# Patient Record
Sex: FEMALE | Race: BLACK OR AFRICAN AMERICAN | Employment: FULL TIME | URBAN - METROPOLITAN AREA
[De-identification: names, ages, dates, MRNs, and addresses within clinical notes are randomized per-mention and may not be internally consistent; named-entity substitution may affect disease eponyms.]

---

## 2018-07-17 ENCOUNTER — TELEPHONE (OUTPATIENT)
Dept: FAMILY MEDICINE CLINIC | Age: 48
End: 2018-07-17

## 2018-07-17 DIAGNOSIS — Z01.419 WELL WOMAN EXAM: Primary | ICD-10-CM

## 2018-07-17 NOTE — TELEPHONE ENCOUNTER
Patient asking that fasting lab order be placed for pending physical with you when schedule opens. Patient placed on call list as she states she's been trying to get appt now for months.       Patient have lab appt for 8/3/18     Thanks

## 2018-07-18 NOTE — TELEPHONE ENCOUNTER
Attempted to call patient to notify labs have been placed. Left voicemail for patient to return call.

## 2018-07-19 NOTE — TELEPHONE ENCOUNTER
Notified patient of labs have been ordered. Appointment scheduled for 8/8/18 with Dr. Chasity Garcia. Patient verbalized understanding.

## 2018-08-03 ENCOUNTER — LAB ONLY (OUTPATIENT)
Dept: FAMILY MEDICINE CLINIC | Age: 48
End: 2018-08-03

## 2018-08-03 DIAGNOSIS — Z01.419 WELL WOMAN EXAM: ICD-10-CM

## 2018-08-03 NOTE — MR AVS SNAPSHOT
[address] 
[phone] Patient: Hood Jones MRN: [id number] NQU:[id number] Visit Information Date & Time Provider Department Dept. Phone Encounter #  
 8/3/2018  9:00 AM LAB [facility] [address] [place] [address] [phone] [id number] Your Appointments 8/8/2018  3:00 PM  
Complete Physical with Linda Juarez MD  
[address] [place] [address]) Appt Note: Complete physical per Dr. Matilde Lombardi [address] [facility]  
[phone]  
  
   
 [address] [phone] Upcoming Health Maintenance Date Due  
 PAP AKA CERVICAL CYTOLOGY 12/21/1992 Influenza Age 5 to Adult 8/1/2018 DTaP/Tdap/Td series (2 - Td) 8/14/2024 Allergies as of 8/3/2018  Review Complete On: 10/26/2015 By: Linda Juarez MD  
  
 Severity Noted Reaction Type Reactions Amoxicillin  11/01/2013    Itching Current Immunizations  Never Reviewed Name Date Influenza Vaccine 10/12/2015 Tdap 8/14/2014 Not reviewed this visit You Were Diagnosed With   
  
 Codes Comments Well woman exam     ICD-10-CM: M85.529 ICD-9-CM: V72.31 Vitals OB Status Smoking Status Hysterectomy Never Smoker Preferred Pharmacy Pharmacy Name Phone [address] [place] [id number] [facility] [phone] Your Updated Medication List  
  
   
This list is accurate as of 8/3/18 11:42 AM.  Always use your most recent med list.  
  
  
  
  
 FEOSOL 45 mg Tab Generic drug:  iron, carbonyl Take 1 Tab by mouth daily. M-VIT PO Take  by mouth.  
  
 multivitamin tablet Commonly known as:  ONE A DAY Take 1 Tab by mouth daily. ORTHO-NOVUM 7/7/7 (28) 0.5/0.75/1 mg- 35 mcg Tab Generic drug:  norethindrone-ethinyl estradiol Take 1 Tab by mouth daily. VITAMIN D3 2,000 unit Cap capsule Generic drug:  Cholecalciferol (Vitamin D3) Take  by mouth two (2) times a day. We Performed the Following CBC W/O DIFF [24402 CPT(R)] HEMOGLOBIN A1C WITH EAG [87786 CPT(R)] LIPID PANEL [96055 CPT(R)] METABOLIC PANEL, COMPREHENSIVE [37427 CPT(R)] TSH 3RD GENERATION [50325 CPT(R)] Introducing [facility] & HEALTH SERVICES! Romayne Duster introduces 19pay patient portal. Now you can access parts of your medical record, email your doctor's office, and request medication refills online. 1. In your internet browser, go to [email or web address] 2. Click on the First Time User? Click Here link in the Sign In box. You will see the New Member Sign Up page. 3. Enter your 19pay Access Code exactly as it appears below. You will not need to use this code after youve completed the sign-up process. If you do not sign up before the expiration date, you must request a new code. · 19pay Access Code: [email or web address] Expires: 11/1/2018 11:42 AM 
 
4. Enter the last four digits of your Social Security Number (xxxx) and Date of Birth (mm/dd/yyyy) as indicated and click Submit. You will be taken to the next sign-up page. 5. Create a 19pay ID. This will be your 19pay login ID and cannot be changed, so think of one that is secure and easy to remember. 6. Create a 19pay password. You can change your password at any time. 7. Enter your Password Reset Question and Answer. This can be used at a later time if you forget your password. 8. Enter your e-mail address. You will receive e-mail notification when new information is available in [address] 9. Click Sign Up. You can now view and download portions of your medical record. 10. Click the Download Summary menu link to download a portable copy of your medical information.  
 
If you have questions, please visit the Frequently Asked Questions section of the [facility]. Remember, Docea Powerhart is NOT to be used for urgent needs. For medical emergencies, dial 911. Now available from your iPhone and Android! Please provide this summary of care documentation to your next provider. Your primary care clinician is listed as Jeremías Waters. If you have any questions after today's visit, please call [phone].

## 2018-08-04 LAB
ALBUMIN SERPL-MCNC: 4.2 G/DL (ref 3.5–5.5)
ALBUMIN/GLOB SERPL: 1.4 {RATIO} (ref 1.2–2.2)
ALP SERPL-CCNC: 77 IU/L (ref 39–117)
ALT SERPL-CCNC: 17 IU/L (ref 0–32)
AST SERPL-CCNC: 19 IU/L (ref 0–40)
BILIRUB SERPL-MCNC: 0.3 MG/DL (ref 0–1.2)
BUN SERPL-MCNC: 16 MG/DL (ref 6–24)
BUN/CREAT SERPL: 21 (ref 9–23)
CALCIUM SERPL-MCNC: 9.3 MG/DL (ref 8.7–10.2)
CHLORIDE SERPL-SCNC: 106 MMOL/L (ref 96–106)
CHOLEST SERPL-MCNC: 143 MG/DL (ref 100–199)
CO2 SERPL-SCNC: 20 MMOL/L (ref 20–29)
CREAT SERPL-MCNC: 0.77 MG/DL (ref 0.57–1)
ERYTHROCYTE [DISTWIDTH] IN BLOOD BY AUTOMATED COUNT: 13.9 % (ref 12.3–15.4)
EST. AVERAGE GLUCOSE BLD GHB EST-MCNC: 105 MG/DL
GLOBULIN SER CALC-MCNC: 2.9 G/DL (ref 1.5–4.5)
GLUCOSE SERPL-MCNC: 94 MG/DL (ref 65–99)
HBA1C MFR BLD: 5.3 % (ref 4.8–5.6)
HCT VFR BLD AUTO: 35 % (ref 34–46.6)
HDLC SERPL-MCNC: 53 MG/DL
HGB BLD-MCNC: 10.9 G/DL (ref 11.1–15.9)
INTERPRETATION, 910389: NORMAL
LDLC SERPL CALC-MCNC: 80 MG/DL (ref 0–99)
MCH RBC QN AUTO: 25.1 PG (ref 26.6–33)
MCHC RBC AUTO-ENTMCNC: 31.1 G/DL (ref 31.5–35.7)
MCV RBC AUTO: 81 FL (ref 79–97)
PLATELET # BLD AUTO: 310 X10E3/UL (ref 150–379)
POTASSIUM SERPL-SCNC: 4.5 MMOL/L (ref 3.5–5.2)
PROT SERPL-MCNC: 7.1 G/DL (ref 6–8.5)
RBC # BLD AUTO: 4.35 X10E6/UL (ref 3.77–5.28)
SODIUM SERPL-SCNC: 146 MMOL/L (ref 134–144)
TRIGL SERPL-MCNC: 52 MG/DL (ref 0–149)
TSH SERPL DL<=0.005 MIU/L-ACNC: 1.79 UIU/ML (ref 0.45–4.5)
VLDLC SERPL CALC-MCNC: 10 MG/DL (ref 5–40)
WBC # BLD AUTO: 5.8 X10E3/UL (ref 3.4–10.8)

## 2018-08-08 ENCOUNTER — OFFICE VISIT (OUTPATIENT)
Dept: FAMILY MEDICINE CLINIC | Age: 48
End: 2018-08-08

## 2018-08-08 VITALS
BODY MASS INDEX: 36.88 KG/M2 | RESPIRATION RATE: 17 BRPM | HEIGHT: 62 IN | WEIGHT: 200.4 LBS | TEMPERATURE: 98.3 F | HEART RATE: 85 BPM | SYSTOLIC BLOOD PRESSURE: 136 MMHG | DIASTOLIC BLOOD PRESSURE: 78 MMHG | OXYGEN SATURATION: 99 %

## 2018-08-08 DIAGNOSIS — Z00.00 WELL WOMAN EXAM WITHOUT GYNECOLOGICAL EXAM: Primary | ICD-10-CM

## 2018-08-08 DIAGNOSIS — D64.9 ANEMIA, UNSPECIFIED TYPE: ICD-10-CM

## 2018-08-08 NOTE — PROGRESS NOTES
Chief Complaint   Patient presents with    Complete Physical     1. Have you been to the ER, urgent care clinic since your last visit? Hospitalized since your last visit? No    2. Have you seen or consulted any other health care providers outside of the [address] [place] [address] since your last visit? Include any pap smears or colon screening.  No

## 2018-08-08 NOTE — MR AVS SNAPSHOT
[address] 
[phone] Patient: Marry Chapa MRN: [id number] Visit Information Date & Time Provider Department Dept. Phone Encounter #  
 8/8/2018  3:00 PM Sundeep Diaz, [address] [facility] [phone] [id number] Follow-up Instructions Return if symptoms worsen or fail to improve. Upcoming Health Maintenance Date Due  
 PAP AKA CERVICAL CYTOLOGY 12/21/1992 Influenza Age 5 to Adult 8/1/2018 DTaP/Tdap/Td series (2 - Td) 8/14/2024 Allergies as of 8/8/2018  Review Complete On: 8/8/2018 By: Sundeep Diaz MD  
  
 Severity Noted Reaction Type Reactions Amoxicillin  11/01/2013    Itching Current Immunizations  Never Reviewed Name Date Influenza Vaccine 10/12/2015 Tdap 8/14/2014 Not reviewed this visit You Were Diagnosed With   
  
 Codes Comments Well woman exam without gynecological exam    -  Primary ICD-10-CM: Z00.00 ICD-9-CM: V70.0 Anemia, unspecified type     ICD-10-CM: D64.9 ICD-9-CM: 335. 9 Vitals BP Pulse Temp Resp Height(growth percentile) Weight(growth percentile) 140/79 85 98.3 °F (36.8 °C) (Oral) 17 5' 2\" (1.575 m) 200 lb 6.4 oz (90.9 kg) SpO2 BMI OB Status Smoking Status 99% 36.65 kg/m2 Hysterectomy Never Smoker Vitals History BMI and BSA Data Body Mass Index Body Surface Area  
 36.65 kg/m 2 1.99 m 2 Preferred Pharmacy Pharmacy Name Phone [facility] [address] [facility] - [address], [place] - [phone] [facility] [phone] Your Updated Medication List  
  
   
This list is accurate as of 8/8/18  3:52 PM.  Always use your most recent med list.  
  
  
  
  
 FEOSOL 45 mg Tab Generic drug:  iron, carbonyl Take 1 Tab by mouth daily. M-VIT PO Take  by mouth.  
  
 multivitamin tablet Commonly known as:  ONE A DAY Take 1 Tab by mouth daily. VITAMIN D3 2,000 unit Cap capsule Generic drug:  Cholecalciferol (Vitamin D3) Take  by mouth two (2) times a day. We Performed the Following CBC WITH AUTOMATED DIFF [06031 CPT(R)] HEMOGLOBIN FRACTIONATION [XGF18960 Custom] IRON PROFILE K5802125 CPT(R)] RETICULOCYTE COUNT I482811 CPT(R)] VITAMIN B12 & FOLATE [76262 CPT(R)] Follow-up Instructions Return if symptoms worsen or fail to improve. Patient Instructions Anemia: Care Instructions Your Care Instructions Anemia is a low level of red blood cells, which carry oxygen throughout your body. Many things can cause anemia. Lack of iron is one of the most common causes. Your body needs iron to make hemoglobin, a substance in red blood cells that carries oxygen from the lungs to your body's cells. Without enough iron, the body produces fewer and smaller red blood cells. As a result, your body's cells do not get enough oxygen, and you feel tired and weak. And you may have trouble concentrating. Bleeding is the most common cause of a lack of iron. You may have heavy menstrual bleeding or bleeding caused by conditions such as ulcers, hemorrhoids, or cancer. Regular use of aspirin or other anti-inflammatory medicines (such as ibuprofen) also can cause bleeding in some people. A lack of iron in your diet also can cause anemia, especially at times when the body needs more iron, such as during pregnancy, infancy, and the teen years. Your doctor may have prescribed iron pills. It may take several months of treatment for your iron levels to return to normal. Your doctor also may suggest that you eat foods that are rich in iron, such as meat and beans. There are many other causes of anemia. It is not always due to a lack of iron. Finding the specific cause of your anemia will help your doctor find the right treatment for you. Follow-up care is a key part of your treatment and safety.  Be sure to make and go to all appointments, and call your doctor if you are having problems. It's also a good idea to know your test results and keep a list of the medicines you take. How can you care for yourself at home? · Take your medicines exactly as prescribed. Call your doctor if you think you are having a problem with your medicine. · If your doctor recommends iron pills, take them as directed: ¨ Try to take the pills on an empty stomach about 1 hour before or 2 hours after meals. But you may need to take iron with food to avoid an upset stomach. ¨ Do not take antacids or drink milk or caffeine drinks (such as coffee, tea, or cola) at the same time or within 2 hours of the time that you take your iron. They can make it hard for your body to absorb the iron. ¨ Vitamin C (from food or supplements) helps your body absorb iron. Try taking iron pills with a glass of orange juice or some other food that is high in vitamin C, such as citrus fruits. ¨ Iron pills may cause stomach problems, such as heartburn, nausea, diarrhea, constipation, and cramps. Be sure to drink plenty of fluids, and include fruits, vegetables, and fiber in your diet each day. Iron pills often make your bowel movements dark or green. ¨ If you forget to take an iron pill, do not take a double dose of iron the next time you take a pill. ¨ Keep iron pills out of the reach of small children. An overdose of iron can be very dangerous. · Follow your doctor's advice about eating iron-rich foods. These include red meat, shellfish, poultry, eggs, beans, raisins, whole-grain bread, and leafy green vegetables. · Steam vegetables to help them keep their iron content. When should you call for help? Call 911 anytime you think you may need emergency care. For example, call if: 
  · You have symptoms of a heart attack. These may include: ¨ Chest pain or pressure, or a strange feeling in the chest. 
¨ Sweating. ¨ Shortness of breath. ¨ Nausea or vomiting. ¨ Pain, pressure, or a strange feeling in the back, neck, jaw, or upper belly or in one or both shoulders or arms. ¨ Lightheadedness or sudden weakness. ¨ A fast or irregular heartbeat. After you call 911, the  may tell you to chew 1 adult-strength or 2 to 4 low-dose aspirin. Wait for an ambulance. Do not try to drive yourself.  
  · You passed out (lost consciousness).  
 Call your doctor now or seek immediate medical care if: 
  · You have new or increased shortness of breath.  
  · You are dizzy or lightheaded, or you feel like you may faint.  
  · Your fatigue and weakness continue or get worse.  
  · You have any abnormal bleeding, such as: 
¨ Nosebleeds. ¨ Vaginal bleeding that is different (heavier, more frequent, at a different time of the month) than what you are used to. ¨ Bloody or black stools, or rectal bleeding. ¨ Bloody or pink urine.  
 Watch closely for changes in your health, and be sure to contact your doctor if: 
  · You do not get better as expected. Where can you learn more? Go to http://neetuWorldWingeraries.info/. Enter R301 in the search box to learn more about \"Anemia: Care Instructions. \" Current as of: October 9, 2017 Content Version: 11.7 © 7930-4346 [employer]. Care instructions adapted under license by [employer] (which disclaims liability or warranty for this information). If you have questions about a medical condition or this instruction, always ask your healthcare professional. [place] [address] any warranty or liability for your use of this information. Well Visit, Ages 25 to 48: Care Instructions Your Care Instructions Physical exams can help you stay healthy. Your doctor has checked your overall health and may have suggested ways to take good care of yourself. He or she also may have recommended tests. At home, you can help prevent illness with healthy eating, regular exercise, and other steps. Follow-up care is a key part of your treatment and safety. Be sure to make and go to all appointments, and call your doctor if you are having problems. It's also a good idea to know your test results and keep a list of the medicines you take. How can you care for yourself at home? · Reach and stay at a healthy weight. This will lower your risk for many problems, such as obesity, diabetes, heart disease, and high blood pressure. · Get at least 30 minutes of physical activity on most days of the week. Walking is a good choice. You also may want to do other activities, such as running, swimming, cycling, or playing tennis or team sports. Discuss any changes in your exercise program with your doctor. · Do not smoke or allow others to smoke around you. If you need help quitting, talk to your doctor about stop-smoking programs and medicines. These can increase your chances of quitting for good. · Talk to your doctor about whether you have any risk factors for sexually transmitted infections (STIs). Having one sex partner (who does not have STIs and does not have sex with anyone else) is a good way to avoid these infections. · Use birth control if you do not want to have children at this time. Talk with your doctor about the choices available and what might be best for you. · Protect your skin from too much sun. When you're outdoors from 10 a.m. to 4 p.m., stay in the shade or cover up with clothing and a hat with a wide brim. Wear sunglasses that block UV rays. Even when it's cloudy, put broad-spectrum sunscreen (SPF 30 or higher) on any exposed skin. · See a dentist one or two times a year for checkups and to have your teeth cleaned. · Wear a seat belt in the car. · Drink alcohol in moderation, if at all. That means no more than 2 drinks a day for men and 1 drink a day for women. Follow your doctor's advice about when to have certain tests. These tests can spot problems early. For everyone · Cholesterol. Have the fat (cholesterol) in your blood tested after age 21. Your doctor will tell you how often to have this done based on your age, family history, or other things that can increase your risk for heart disease. · Blood pressure. Have your blood pressure checked during a routine doctor visit. Your doctor will tell you how often to check your blood pressure based on your age, your blood pressure results, and other factors. · Vision. Talk with your doctor about how often to have a glaucoma test. 
· Diabetes. Ask your doctor whether you should have tests for diabetes. · Colon cancer. Have a test for colon cancer at age 48. You may have one of several tests. If you are younger than 48, you may need a test earlier if you have any risk factors. Risk factors include whether you already had a precancerous polyp removed from your colon or whether your parent, brother, sister, or child has had colon cancer. For women · Breast exam and mammogram. Talk to your doctor about when you should have a clinical breast exam and a mammogram. Medical experts differ on whether and how often women under 50 should have these tests. Your doctor can help you decide what is right for you. · Pap test and pelvic exam. Begin Pap tests at age 24. A Pap test is the best way to find cervical cancer. The test often is part of a pelvic exam. Ask how often to have this test. 
· Tests for sexually transmitted infections (STIs). Ask whether you should have tests for STIs. You may be at risk if you have sex with more than one person, especially if your partners do not wear condoms. For men · Tests for sexually transmitted infections (STIs). Ask whether you should have tests for STIs. You may be at risk if you have sex with more than one person, especially if you do not wear a condom. · Testicular cancer exam. Ask your doctor whether you should check your testicles regularly. · Prostate exam. Talk to your doctor about whether you should have a blood test (called a PSA test) for prostate cancer. Experts differ on whether and when men should have this test. Some experts suggest it if you are older than 39 and are -American or have a father or brother who got prostate cancer when he was younger than 72. When should you call for help? Watch closely for changes in your health, and be sure to contact your doctor if you have any problems or symptoms that concern you. Where can you learn more? Go to http://neetu-aries.info/. Enter P072 in the search box to learn more about \"Well Visit, Ages 25 to 48: Care Instructions. \" Current as of: May 16, 2017 Content Version: 11.7 © 4545-2308 [employer]. Care instructions adapted under license by [employer] (which disclaims liability or warranty for this information). If you have questions about a medical condition or this instruction, always ask your healthcare professional. [place] [address] any warranty or liability for your use of this information. Iron Deficiency Anemia: Care Instructions Your Care Instructions Anemia means that you do not have enough red blood cells. Red blood cells carry oxygen around your body. When you have anemia, it can make you pale, weak, and tired. Many things can cause anemia. The most common cause is loss of blood. This can happen if you have heavy menstrual periods. It can also happen if you have bleeding in your stomach or bowel. You can also get anemia if you don't have enough iron in your diet or if it's hard for your body to absorb iron. In some cases, pregnancy causes anemia. That's because a pregnant woman needs more iron. Your doctor may do more tests to find the cause of your anemia. If a disease or other health problem is causing it, your doctor will treat that problem. It's important to follow up with your doctor to make sure that your iron level returns to normal. 
Follow-up care is a key part of your treatment and safety. Be sure to make and go to all appointments, and call your doctor if you are having problems. It's also a good idea to know your test results and keep a list of the medicines you take. How can you care for yourself at home? · If your doctor recommended iron pills, take them as directed. ¨ Try to take the pills on an empty stomach. You can do this about 1 hour before or 2 hours after meals. But you may need to take iron with food to avoid an upset stomach. ¨ Do not take antacids or drink milk or anything with caffeine within 2 hours of when you take your iron. They can keep your body from absorbing the iron well. ¨ Vitamin C helps your body absorb iron. You may want to take iron pills with a glass of orange juice or some other food high in vitamin C. 
¨ Iron pills may cause stomach problems. These include heartburn, nausea, diarrhea, constipation, and cramps. It can help to drink plenty of fluids and include fruits, vegetables, and fiber in your diet. ¨ It's normal for iron pills to make your stool a greenish or grayish black. But internal bleeding can also cause dark stool. So it's important to tell your doctor about any color changes. ¨ Call your doctor if you think you are having a problem with your iron pills. Even after you start to feel better, it will take several months for your body to build up its supply of iron. ¨ If you miss a pill, don't take a double dose. ¨ Keep iron pills out of the reach of small children. Too much iron can be very dangerous. · Eat foods with a lot of iron. These include red meat, shellfish, poultry, and eggs. They also include beans, raisins, whole-grain bread, and leafy green vegetables. · Steam your vegetables. This is the best way to prepare them if you want to get as much iron as possible. · Be safe with medicines. Do not take nonsteroidal anti-inflammatory pain relievers unless your doctor tells you to. These include aspirin, naproxen (Aleve), and ibuprofen (Advil, Motrin). · Liquid iron can stain your teeth. But you can mix it with water or juice and drink it with a straw. Then it won't get on your teeth. When should you call for help? Call 911 anytime you think you may need emergency care. For example, call if: 
  · You passed out (lost consciousness).  
 Call your doctor now or seek immediate medical care if: 
  · You are short of breath.  
  · You are dizzy or light-headed, or you feel like you may faint.  
  · You have new or worse bleeding.  
 Watch closely for changes in your health, and be sure to contact your doctor if: 
  · You feel weaker or more tired than usual.  
  · You do not get better as expected. Where can you learn more? Go to http://neetu-aries.info/. Enter I934 in the search box to learn more about \"Iron Deficiency Anemia: Care Instructions. \" Current as of: October 9, 2017 Content Version: 11.7 © 8736-0823 DailyPath. Care instructions adapted under license by [employer] (which disclaims liability or warranty for this information). If you have questions about a medical condition or this instruction, always ask your healthcare professional. [address] any warranty or liability for your use of this information. Introducing [facility]! [employer] introduces [employer] patient portal. Now you can access parts of your medical record, email your doctor's office, and request medication refills online. 1. In your internet browser, go to [email or web address] 2. Click on the First Time User? Click Here link in the Sign In box. You will see the New Member Sign Up page. 3. Enter your [employer] Access Code exactly as it appears below.  You will not need to use this code after youve completed the sign-up process. If you do not sign up before the expiration date, you must request a new code. · iFlipd Access Code: [email or web address] Expires: 11/1/2018 11:42 AM 
 
4. Enter the last four digits of your Social Security Number (xxxx) and Date of Birth (mm/dd/yyyy) as indicated and click Submit. You will be taken to the next sign-up page. 5. Create a iFlipd ID. This will be your iFlipd login ID and cannot be changed, so think of one that is secure and easy to remember. 6. Create a iFlipd password. You can change your password at any time. 7. Enter your Password Reset Question and Answer. This can be used at a later time if you forget your password. 8. Enter your e-mail address. You will receive e-mail notification when new information is available in [address] 9. Click Sign Up. You can now view and download portions of your medical record. 10. Click the Download Summary menu link to download a portable copy of your medical information. If you have questions, please visit the Frequently Asked Questions section of the iFlipd website. Remember, iFlipd is NOT to be used for urgent needs. For medical emergencies, dial 911. Now available from your iPhone and Android! Please provide this summary of care documentation to your next provider. Your primary care clinician is listed as Asuncion Mai. If you have any questions after today's visit, please call [phone].

## 2018-08-08 NOTE — PATIENT INSTRUCTIONS
Anemia: Care Instructions  Your Care Instructions    Anemia is a low level of red blood cells, which carry oxygen throughout your body. Many things can cause anemia. Lack of iron is one of the most common causes. Your body needs iron to make hemoglobin, a substance in red blood cells that carries oxygen from the lungs to your body's cells. Without enough iron, the body produces fewer and smaller red blood cells. As a result, your body's cells do not get enough oxygen, and you feel tired and weak. And you may have trouble concentrating. Bleeding is the most common cause of a lack of iron. You may have heavy menstrual bleeding or bleeding caused by conditions such as ulcers, hemorrhoids, or cancer. Regular use of aspirin or other anti-inflammatory medicines (such as ibuprofen) also can cause bleeding in some people. A lack of iron in your diet also can cause anemia, especially at times when the body needs more iron, such as during pregnancy, infancy, and the teen years. Your doctor may have prescribed iron pills. It may take several months of treatment for your iron levels to return to normal. Your doctor also may suggest that you eat foods that are rich in iron, such as meat and beans. There are many other causes of anemia. It is not always due to a lack of iron. Finding the specific cause of your anemia will help your doctor find the right treatment for you. Follow-up care is a key part of your treatment and safety. Be sure to make and go to all appointments, and call your doctor if you are having problems. It's also a good idea to know your test results and keep a list of the medicines you take. How can you care for yourself at home? · Take your medicines exactly as prescribed. Call your doctor if you think you are having a problem with your medicine. · If your doctor recommends iron pills, take them as directed:  ¨ Try to take the pills on an empty stomach about 1 hour before or 2 hours after meals. But you may need to take iron with food to avoid an upset stomach. ¨ Do not take antacids or drink milk or caffeine drinks (such as coffee, tea, or cola) at the same time or within 2 hours of the time that you take your iron. They can make it hard for your body to absorb the iron. ¨ Vitamin C (from food or supplements) helps your body absorb iron. Try taking iron pills with a glass of orange juice or some other food that is high in vitamin C, such as citrus fruits. ¨ Iron pills may cause stomach problems, such as heartburn, nausea, diarrhea, constipation, and cramps. Be sure to drink plenty of fluids, and include fruits, vegetables, and fiber in your diet each day. Iron pills often make your bowel movements dark or green. ¨ If you forget to take an iron pill, do not take a double dose of iron the next time you take a pill. ¨ Keep iron pills out of the reach of small children. An overdose of iron can be very dangerous. · Follow your doctor's advice about eating iron-rich foods. These include red meat, shellfish, poultry, eggs, beans, raisins, whole-grain bread, and leafy green vegetables. · Steam vegetables to help them keep their iron content. When should you call for help? Call 911 anytime you think you may need emergency care. For example, call if:    · You have symptoms of a heart attack. These may include:  ¨ Chest pain or pressure, or a strange feeling in the chest.  ¨ Sweating. ¨ Shortness of breath. ¨ Nausea or vomiting. ¨ Pain, pressure, or a strange feeling in the back, neck, jaw, or upper belly or in one or both shoulders or arms. ¨ Lightheadedness or sudden weakness. ¨ A fast or irregular heartbeat. After you call 911, the  may tell you to chew 1 adult-strength or 2 to 4 low-dose aspirin. Wait for an ambulance.  Do not try to drive yourself.     · You passed out (lost consciousness).    Call your doctor now or seek immediate medical care if:    · You have new or increased shortness of breath.     · You are dizzy or lightheaded, or you feel like you may faint.     · Your fatigue and weakness continue or get worse.     · You have any abnormal bleeding, such as:  ¨ Nosebleeds. ¨ Vaginal bleeding that is different (heavier, more frequent, at a different time of the month) than what you are used to. ¨ Bloody or black stools, or rectal bleeding. ¨ Bloody or pink urine.    Watch closely for changes in your health, and be sure to contact your doctor if:    · You do not get better as expected. Where can you learn more? Go to http://neetu-aries.info/. Enter R301 in the search box to learn more about \"Anemia: Care Instructions. \"  Current as of: October 9, 2017  Content Version: 11.7  © 7930-3805 [employer]. Care instructions adapted under license by [employer] (which disclaims liability or warranty for this information). If you have questions about a medical condition or this instruction, always ask your healthcare professional. [place] [address] any warranty or liability for your use of this information. Well Visit, Ages 25 to 48: Care Instructions  Your Care Instructions    Physical exams can help you stay healthy. Your doctor has checked your overall health and may have suggested ways to take good care of yourself. He or she also may have recommended tests. At home, you can help prevent illness with healthy eating, regular exercise, and other steps. Follow-up care is a key part of your treatment and safety. Be sure to make and go to all appointments, and call your doctor if you are having problems. It's also a good idea to know your test results and keep a list of the medicines you take. How can you care for yourself at home? · Reach and stay at a healthy weight. This will lower your risk for many problems, such as obesity, diabetes, heart disease, and high blood pressure.   · Get at least 30 minutes of physical activity on most days of the week. Walking is a good choice. You also may want to do other activities, such as running, swimming, cycling, or playing tennis or team sports. Discuss any changes in your exercise program with your doctor. · Do not smoke or allow others to smoke around you. If you need help quitting, talk to your doctor about stop-smoking programs and medicines. These can increase your chances of quitting for good. · Talk to your doctor about whether you have any risk factors for sexually transmitted infections (STIs). Having one sex partner (who does not have STIs and does not have sex with anyone else) is a good way to avoid these infections. · Use birth control if you do not want to have children at this time. Talk with your doctor about the choices available and what might be best for you. · Protect your skin from too much sun. When you're outdoors from 10 a.m. to 4 p.m., stay in the shade or cover up with clothing and a hat with a wide brim. Wear sunglasses that block UV rays. Even when it's cloudy, put broad-spectrum sunscreen (SPF 30 or higher) on any exposed skin. · See a dentist one or two times a year for checkups and to have your teeth cleaned. · Wear a seat belt in the car. · Drink alcohol in moderation, if at all. That means no more than 2 drinks a day for men and 1 drink a day for women. Follow your doctor's advice about when to have certain tests. These tests can spot problems early. For everyone  · Cholesterol. Have the fat (cholesterol) in your blood tested after age 21. Your doctor will tell you how often to have this done based on your age, family history, or other things that can increase your risk for heart disease. · Blood pressure. Have your blood pressure checked during a routine doctor visit. Your doctor will tell you how often to check your blood pressure based on your age, your blood pressure results, and other factors. · Vision.  Talk with your doctor about how often to have a glaucoma test.  · Diabetes. Ask your doctor whether you should have tests for diabetes. · Colon cancer. Have a test for colon cancer at age 48. You may have one of several tests. If you are younger than 48, you may need a test earlier if you have any risk factors. Risk factors include whether you already had a precancerous polyp removed from your colon or whether your parent, brother, sister, or child has had colon cancer. For women  · Breast exam and mammogram. Talk to your doctor about when you should have a clinical breast exam and a mammogram. Medical experts differ on whether and how often women under 50 should have these tests. Your doctor can help you decide what is right for you. · Pap test and pelvic exam. Begin Pap tests at age 24. A Pap test is the best way to find cervical cancer. The test often is part of a pelvic exam. Ask how often to have this test.  · Tests for sexually transmitted infections (STIs). Ask whether you should have tests for STIs. You may be at risk if you have sex with more than one person, especially if your partners do not wear condoms. For men  · Tests for sexually transmitted infections (STIs). Ask whether you should have tests for STIs. You may be at risk if you have sex with more than one person, especially if you do not wear a condom. · Testicular cancer exam. Ask your doctor whether you should check your testicles regularly. · Prostate exam. Talk to your doctor about whether you should have a blood test (called a PSA test) for prostate cancer. Experts differ on whether and when men should have this test. Some experts suggest it if you are older than 39 and are -American or have a father or brother who got prostate cancer when he was younger than 72. When should you call for help? Watch closely for changes in your health, and be sure to contact your doctor if you have any problems or symptoms that concern you. Where can you learn more?   Go to http://neetu-aries.info/. Enter P072 in the search box to learn more about \"Well Visit, Ages 25 to 48: Care Instructions. \"  Current as of: May 16, 2017  Content Version: 11.7  © 2190-0099 [employer]. Care instructions adapted under license by [employer] (which disclaims liability or warranty for this information). If you have questions about a medical condition or this instruction, always ask your healthcare professional. [address] any warranty or liability for your use of this information. Iron Deficiency Anemia: Care Instructions  Your Care Instructions    Anemia means that you do not have enough red blood cells. Red blood cells carry oxygen around your body. When you have anemia, it can make you pale, weak, and tired. Many things can cause anemia. The most common cause is loss of blood. This can happen if you have heavy menstrual periods. It can also happen if you have bleeding in your stomach or bowel. You can also get anemia if you don't have enough iron in your diet or if it's hard for your body to absorb iron. In some cases, pregnancy causes anemia. That's because a pregnant woman needs more iron. Your doctor may do more tests to find the cause of your anemia. If a disease or other health problem is causing it, your doctor will treat that problem. It's important to follow up with your doctor to make sure that your iron level returns to normal.  Follow-up care is a key part of your treatment and safety. Be sure to make and go to all appointments, and call your doctor if you are having problems. It's also a good idea to know your test results and keep a list of the medicines you take. How can you care for yourself at home? · If your doctor recommended iron pills, take them as directed. ¨ Try to take the pills on an empty stomach. You can do this about 1 hour before or 2 hours after meals.  But you may need to take iron with food to avoid an upset stomach. ¨ Do not take antacids or drink milk or anything with caffeine within 2 hours of when you take your iron. They can keep your body from absorbing the iron well. ¨ Vitamin C helps your body absorb iron. You may want to take iron pills with a glass of orange juice or some other food high in vitamin C.  ¨ Iron pills may cause stomach problems. These include heartburn, nausea, diarrhea, constipation, and cramps. It can help to drink plenty of fluids and include fruits, vegetables, and fiber in your diet. ¨ It's normal for iron pills to make your stool a greenish or grayish black. But internal bleeding can also cause dark stool. So it's important to tell your doctor about any color changes. ¨ Call your doctor if you think you are having a problem with your iron pills. Even after you start to feel better, it will take several months for your body to build up its supply of iron. ¨ If you miss a pill, don't take a double dose. ¨ Keep iron pills out of the reach of small children. Too much iron can be very dangerous. · Eat foods with a lot of iron. These include red meat, shellfish, poultry, and eggs. They also include beans, raisins, whole-grain bread, and leafy green vegetables. · Steam your vegetables. This is the best way to prepare them if you want to get as much iron as possible. · Be safe with medicines. Do not take nonsteroidal anti-inflammatory pain relievers unless your doctor tells you to. These include aspirin, naproxen (Aleve), and ibuprofen (Advil, Motrin). · Liquid iron can stain your teeth. But you can mix it with water or juice and drink it with a straw. Then it won't get on your teeth. When should you call for help? Call 911 anytime you think you may need emergency care.  For example, call if:    · You passed out (lost consciousness).    Call your doctor now or seek immediate medical care if:    · You are short of breath.     · You are dizzy or light-headed, or you feel like you may faint.     · You have new or worse bleeding.    Watch closely for changes in your health, and be sure to contact your doctor if:    · You feel weaker or more tired than usual.     · You do not get better as expected. Where can you learn more? Go to http://neetu-aries.info/. Enter P614 in the search box to learn more about \"Iron Deficiency Anemia: Care Instructions. \"  Current as of: October 9, 2017  Content Version: 11.7  © 3065-8728 [employer]. Care instructions adapted under license by [employer] (which disclaims liability or warranty for this information). If you have questions about a medical condition or this instruction, always ask your healthcare professional. [address] any warranty or liability for your use of this information.

## 2018-08-08 NOTE — PROGRESS NOTES
Eduardo Perdomo  56 y.o. female  1970  [address] [facility] [place]  [id number]   [address]: Progress Note  Gerber Vilchis MD       Encounter Date: 2018    Chief Complaint   Patient presents with    Complete Physical     History of Present Illness   Eduardo Perdomo is a 56 y.o. female who presents to clinic today for CPE without pap. S/p hysterectomy for uterine fibroids. Off of OCPs. Has OB GYN. Hx of normal pap smears prior to hysterectomy. UTD. Done this year which was normal.   Mammogram UTD which was normal.   -colonoscopy-not indicated. dexa-not indicated. tdap done in   [id number]/p PTSVD at 36 wks for preeclampsia. Diet and exercise: play softball with [employer] members, reg dog walking. Depression screen: PHQ2 neg  -currently single and not currently sexually active. IPV screening neg. FHx: neg for malignancy. SHx: denies any smoking; social drinking; no drugs. Works as a pharmacist. Single parent and co parenting 13 yr old daughter. Review of Systems   Review of Systems   Constitutional: Negative. HENT: Negative. Eyes: Negative. Respiratory: Negative. Cardiovascular: Negative. Gastrointestinal: Negative. All other systems reviewed and are negative. Vitals/Objective:     Vitals:    18 1510 18 1555   BP: 140/79 136/78   Pulse: 85    Resp: 17    Temp: 98.3 °F (36.8 °C)    TempSrc: Oral    SpO2: 99%    Weight: 200 lb 6.4 oz (90.9 kg)    Height: 5' 2\" (1.575 m)      Body mass index is 36.65 kg/(m^2). Physical Exam   Constitutional: She appears well-developed and well-nourished. No distress. Neck: Neck supple. No thyromegaly present. Cardiovascular: Normal rate, regular rhythm, normal heart sounds and intact distal pulses. Exam reveals no gallop and no friction rub. No murmur heard. Pulmonary/Chest: Effort normal and breath sounds normal. No respiratory distress. She has no wheezes.  She has no rales. Abdominal: Soft. Bowel sounds are normal. She exhibits no distension. There is no tenderness. There is no rebound and no guarding. Genitourinary: Vagina normal and uterus normal.   Musculoskeletal: Normal range of motion. She exhibits no edema or tenderness. Neurological: She has normal reflexes. Skin: She is not diaphoretic. Vitals reviewed. breast and pelvic exams not indicated. Thereby deferred  Lab Results   Component Value Date/Time    WBC 5.8 08/03/2018 09:17 AM    HGB 10.9 (L) 08/03/2018 09:17 AM    HCT 35.0 08/03/2018 09:17 AM    PLATELET 129 52/40/6347 09:17 AM    MCV 81 08/03/2018 09:17 AM     Lab Results   Component Value Date/Time    Sodium 146 (H) 08/03/2018 09:17 AM    Potassium 4.5 08/03/2018 09:17 AM    Chloride 106 08/03/2018 09:17 AM    CO2 20 08/03/2018 09:17 AM    Glucose 94 08/03/2018 09:17 AM    BUN 16 08/03/2018 09:17 AM    Creatinine 0.77 08/03/2018 09:17 AM    BUN/Creatinine ratio 21 08/03/2018 09:17 AM    GFR est  08/03/2018 09:17 AM    GFR est non-AA 93 08/03/2018 09:17 AM    Calcium 9.3 08/03/2018 09:17 AM    Bilirubin, total 0.3 08/03/2018 09:17 AM    AST (SGOT) 19 08/03/2018 09:17 AM    Alk. phosphatase 77 08/03/2018 09:17 AM    Protein, total 7.1 08/03/2018 09:17 AM    Albumin 4.2 08/03/2018 09:17 AM    A-G Ratio 1.4 08/03/2018 09:17 AM    ALT (SGPT) 17 08/03/2018 09:17 AM     Lab Results   Component Value Date/Time    TSH 1.790 08/03/2018 09:17 AM     Lab Results   Component Value Date/Time    Hemoglobin A1c 5.3 08/03/2018 09:17 AM    Hemoglobin A1c (POC) 5.5 10/26/2015 10:15 AM     Lab Results   Component Value Date/Time    Cholesterol, total 143 08/03/2018 09:17 AM    HDL Cholesterol 53 08/03/2018 09:17 AM    LDL, calculated 80 08/03/2018 09:17 AM    VLDL, calculated 10 08/03/2018 09:17 AM    Triglyceride 52 08/03/2018 09:17 AM       No results found for this or any previous visit (from the past 24 hour(s)). Assessment and Plan:   1.  Well woman exam without gynecological exam  Routine labs. F/u with OB GYN    2. Anemia, unspecified type  Screen for hemoglobinopathy. Routine labs today. Hx of iron def anemia. If confirmed will need GI eval as she is currently s/p hysterectomy. - CBC WITH AUTOMATED DIFF  - HEMOGLOBIN FRACTIONATION  - IRON PROFILE  - RETICULOCYTE COUNT  - VITAMIN B12 & FOLATE    I have discussed the diagnosis with the patient and the intended plan as seen in the above orders. she has expressed understanding. The patient has received an after-visit summary and questions were answered concerning future plans. I have discussed medication side effects and warnings with the patient as well. Follow-up Disposition:  Return if symptoms worsen or fail to improve. Electronically Signed: Maci Hubbard MD     History   Patients past medical, surgical and family histories were reviewed and updated. Past Medical History:   Diagnosis Date    Anemia     Fibroids      Past Surgical History:   Procedure Laterality Date    HX CHOLECYSTECTOMY  2007    HX GYN  2003    HX HYSTERECTOMY  03/2014    still has R ovary (not really sure)    HX OOPHORECTOMY  2003     Family History   Problem Relation Age of Onset    Cancer Mother      thymus, esophagus    Cancer Maternal Grandfather      stomach    Cancer Paternal Grandmother      Social History     Social History    Marital status: SINGLE     Spouse name: N/A    Number of children: N/A    Years of education: N/A     Occupational History    Not on file.      Social History Main Topics    Smoking status: Never Smoker    Smokeless tobacco: Never Used    Alcohol use Yes      Comment: ocassionally    Drug use: No    Sexual activity: Not Currently     Birth control/ protection: Surgical     Other Topics Concern    Not on file     Social History Narrative            Current Medications/Allergies     Current Outpatient Prescriptions   Medication Sig Dispense Refill    Cholecalciferol, Vitamin D3, (VITAMIN D3) 2,000 unit cap capsule Take  by mouth two (2) times a day.  multivitamin (ONE A DAY) tablet Take 1 Tab by mouth daily.  iron, carbonyl (FEOSOL) 45 mg Tab Take 1 Tab by mouth daily.  PV W-O JORGE/FERROUS FUMARATE/FA (M-VIT PO) Take  by mouth.        Allergies   Allergen Reactions    Amoxicillin Itching

## 2018-08-09 ENCOUNTER — LAB ONLY (OUTPATIENT)
Dept: FAMILY MEDICINE CLINIC | Age: 48
End: 2018-08-09

## 2018-08-09 ENCOUNTER — TELEPHONE (OUTPATIENT)
Dept: FAMILY MEDICINE CLINIC | Age: 48
End: 2018-08-09

## 2018-08-09 NOTE — TELEPHONE ENCOUNTER
----- Message from Fredo Trujillo sent at 8/9/2018 11:20 AM EDT -----  Regarding: Dr Deborah Rodríguez  Pt requesting call back to [phone]. She wants to know if she can come get labs done at lunch time?

## 2018-08-13 LAB
BASOPHILS # BLD AUTO: 0 X10E3/UL (ref 0–0.2)
BASOPHILS NFR BLD AUTO: 0 %
EOSINOPHIL # BLD AUTO: 0.1 X10E3/UL (ref 0–0.4)
EOSINOPHIL NFR BLD AUTO: 2 %
ERYTHROCYTE [DISTWIDTH] IN BLOOD BY AUTOMATED COUNT: 13.7 % (ref 12.3–15.4)
FOLATE SERPL-MCNC: 14.5 NG/ML
HCT VFR BLD AUTO: 35.5 % (ref 34–46.6)
HGB A MFR BLD: 97.7 % (ref 96.4–98.8)
HGB A2 MFR BLD COLUMN CHROM: 2.3 % (ref 1.8–3.2)
HGB BLD-MCNC: 11.1 G/DL (ref 11.1–15.9)
HGB C MFR BLD: 0 %
HGB F MFR BLD: 0 % (ref 0–2)
HGB FRACT BLD-IMP: NORMAL
HGB OTHER MFR BLD HPLC: 0 %
HGB S BLD QL SOLY: NEGATIVE
HGB S MFR BLD: 0 %
IMM GRANULOCYTES # BLD: 0 X10E3/UL (ref 0–0.1)
IMM GRANULOCYTES NFR BLD: 0 %
IRON SATN MFR SERPL: 25 % (ref 15–55)
IRON SERPL-MCNC: 70 UG/DL (ref 27–159)
LYMPHOCYTES # BLD AUTO: 3.4 X10E3/UL (ref 0.7–3.1)
LYMPHOCYTES NFR BLD AUTO: 48 %
MCH RBC QN AUTO: 25.7 PG (ref 26.6–33)
MCHC RBC AUTO-ENTMCNC: 31.3 G/DL (ref 31.5–35.7)
MCV RBC AUTO: 82 FL (ref 79–97)
MONOCYTES # BLD AUTO: 0.3 X10E3/UL (ref 0.1–0.9)
MONOCYTES NFR BLD AUTO: 4 %
NEUTROPHILS # BLD AUTO: 3.2 X10E3/UL (ref 1.4–7)
NEUTROPHILS NFR BLD AUTO: 46 %
PLATELET # BLD AUTO: 309 X10E3/UL (ref 150–379)
RBC # BLD AUTO: 4.32 X10E6/UL (ref 3.77–5.28)
RETICS/RBC NFR AUTO: 1.3 % (ref 0.6–2.6)
TIBC SERPL-MCNC: 282 UG/DL (ref 250–450)
UIBC SERPL-MCNC: 212 UG/DL (ref 131–425)
VIT B12 SERPL-MCNC: 379 PG/ML (ref 232–1245)
WBC # BLD AUTO: 7 X10E3/UL (ref 3.4–10.8)

## 2018-08-14 ENCOUNTER — TELEPHONE (OUTPATIENT)
Dept: FAMILY MEDICINE CLINIC | Age: 48
End: 2018-08-14

## 2018-08-14 NOTE — TELEPHONE ENCOUNTER
Attempted to call patient about lab results per Dr. Nohemy Lerner. Left voicemail for patient to return call.

## 2018-08-14 NOTE — PROGRESS NOTES
Call: your anemia has improved and all of your labs are normal. You may return in one year or sooner if needed. Have a great rest of the year.

## 2018-08-15 NOTE — TELEPHONE ENCOUNTER
----- Message from Conner Anderson sent at 8/15/2018 11:26 AM EDT -----  Regarding: /Telephone  Pt is returning a call    Best contact is [phone] and (w) [phone]

## 2018-08-15 NOTE — TELEPHONE ENCOUNTER
Second attempt at calling patient about lab results per Dr. Sharon Austin. Left voicemail for patient to return call.

## 2018-08-15 NOTE — TELEPHONE ENCOUNTER
----- Message from Joe Henry sent at 8/15/2018  2:44 PM EDT -----  Regarding: Dr. Yecenia Berrios / Telephone  Pt is returning a missed call to Trinidad Mars.  Best contact: [phone] opt: 0

## 2018-08-15 NOTE — TELEPHONE ENCOUNTER
Attempted to call patient about lab results per Dr. Davin Vinson. Left voicemail for patient to return call.

## 2018-08-16 NOTE — TELEPHONE ENCOUNTER
Patient notified of lab results per Dr. Roscoe Anderson. Patient verbalized understanding. Patient wanted to know if she should continuing taking iron pills. Advised patient message would be sent to physician regarding question.  Patient verbalized understanding

## 2018-08-20 NOTE — TELEPHONE ENCOUNTER
Notified patient per Dr. Yecenia Berrios to continue to take the iron pills as her hemoglobin is normal . Patient verbalized understanding.

## 2021-02-02 ENCOUNTER — TELEPHONE (OUTPATIENT)
Dept: FAMILY MEDICINE CLINIC | Age: 51
End: 2021-02-02

## 2021-02-02 NOTE — TELEPHONE ENCOUNTER
----- Message from Ela Dodson sent at 1/13/2021  2:40 PM EST -----  Regarding: /Telephone  Contact: 17 20 20  Appointment not available    Caller's first and last name and relationship to patient (if not the patient): N/A      Best contact number: [phone]       Preferred date and time: First available       Scheduled appointment date and time: N/A      Reason for appointment: NP est PCP      Details to clarify the request:pt is fine with in office or VV.     Ela Dodson

## 2021-02-02 NOTE — TELEPHONE ENCOUNTER
Left voice mail to schedule a virtual appt. At this time is NOT a new patient, she is only asking for Dr. Shazia Nunes.

## 2021-03-03 ENCOUNTER — VIRTUAL VISIT (OUTPATIENT)
Dept: FAMILY MEDICINE CLINIC | Age: 51
End: 2021-03-03
Payer: COMMERCIAL

## 2021-03-03 ENCOUNTER — TELEPHONE (OUTPATIENT)
Dept: FAMILY MEDICINE CLINIC | Age: 51
End: 2021-03-03

## 2021-03-03 DIAGNOSIS — D64.9 ANEMIA, UNSPECIFIED TYPE: Primary | ICD-10-CM

## 2021-03-03 DIAGNOSIS — E66.09 OBESITY DUE TO EXCESS CALORIES WITHOUT SERIOUS COMORBIDITY, UNSPECIFIED CLASSIFICATION: ICD-10-CM

## 2021-03-03 DIAGNOSIS — Z76.89 ENCOUNTER TO ESTABLISH CARE WITH NEW DOCTOR: ICD-10-CM

## 2021-03-03 DIAGNOSIS — R79.89 LOW VITAMIN D LEVEL: ICD-10-CM

## 2021-03-03 DIAGNOSIS — Z12.11 COLON CANCER SCREENING: ICD-10-CM

## 2021-03-03 DIAGNOSIS — Z12.31 ENCOUNTER FOR SCREENING MAMMOGRAM FOR MALIGNANT NEOPLASM OF BREAST: ICD-10-CM

## 2021-03-03 PROCEDURE — 99203 OFFICE O/P NEW LOW 30 MIN: CPT | Performed by: FAMILY MEDICINE

## 2021-03-03 NOTE — TELEPHONE ENCOUNTER
Called, no answer. [id number] [facility]  Office  ===View-only below this line===  ----- Message -----  From: Avelino Adam MD  Sent: 3/3/2021   1:59 PM EST  To: [facility] Front Office    Pt needs in office visit with pap. If that will be in the next 1-2 months, can schedule for Well woman visit. If no in office availability yet, please schedule for lab only and placet tickler to schedule for in office visit with next schedule. Thanks!

## 2021-03-03 NOTE — PROGRESS NOTES
TELEMEDICINE VISIT    Consent: She and/or the health care decision maker is aware that that she may receive a bill for this telephone service, depending on her insurance coverage, and has provided verbal consent to proceed: Yes  History of Present Illness:     Chief Complaint   Patient presents with   Shorty Purvis is a 53 y.o. female was evaluated by synchronous (real-time) audio-video technology via [employer] Me.    Doing well. No problems. Reports having a hard time staying hydrated. 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 Formally followed by GYN but they had left the practice. S/p hysterectomy for fibroids. Still has cervix. Past Medical History:   Diagnosis Date    Anemia     Fibroids      Reviewed PMH, surgical hx, family hx and social hx. Updated; see chart. Current Medications/Allergies (REVIEWED)     No current outpatient medications on file prior to visit. No current facility-administered medications on file prior to visit. Allergies   Allergen Reactions    Amoxicillin Itching         Review of Systems     No fever, chills, sweats  No chest pain, BONDS, palpitations, LE edema  No cough, sputum production, SOB, pleuritic chest pain, wheezing  No n/v/d, constipation, melena, blood in stool    Objective:     General: alert, cooperative, no distress   Mental  status: mental status: alert, oriented to person, place, and time, normal mood, behavior, speech, dress, motor activity, and thought processes   Resp: resp: normal effort and no respiratory distress   Neuro: neuro: no gross deficits   Skin: skin: no discoloration or lesions of concern on visible areas   Due to this being a TeleHealth evaluation, many elements of the physical examination are unable to be assessed. Assessment and Plan:       ICD-10-CM ICD-9-CM    1. Anemia, unspecified type  D64.9 285.9    2.  Encounter for screening mammogram for malignant neoplasm of breast  Z12.31 V76.12 ALMA 3D JUWAN W MAMMO BI SCREENING INCL CAD   3. Colon cancer screening  Z12.11 V76.51 REFERRAL TO GASTROENTEROLOGY   4. Encounter to establish care with new doctor  Z76.89 V65.8    5. Obesity due to excess calories without serious comorbidity, unspecified classification  E66.09 278.00        Anemia. Will check labs. Updated history and medication list  Not currently taking any medications    Refer to GI for colonoscopy  Mammogram ordered    Needs pap. S/p supracervical hysterectomy     Follow up for in office visit and labs    Electronically Signed: Laureen Mills MD  Providers location when delivering service (clinic, hospital, home): Clinic      We discussed the expected course, resolution and complications of the diagnosis(es) in detail. Medication risks, benefits, costs, interactions, and alternatives were discussed as indicated. I advised her to contact the office if her condition worsens, changes or fails to improve as anticipated. She expressed understanding with the diagnosis(es) and plan. Patient understands that this encounter was a temporary measure, and the importance of further follow up and examination was emphasized. Patient verbalized understanding. Pursuant to the emergency declaration under the [facility] waiver authority and the [employer] and [employer] General Act, this Virtual  Visit was conducted, with patient's consent, to reduce the patient's risk of exposure to COVID-19 and provide continuity of care for an established patient. Services were provided through a video synchronous discussion virtually to substitute for in-person clinic visit.

## 2021-03-03 NOTE — Clinical Note
Pt needs in office visit with pap. If that will be in the next 1-2 months, can schedule for Well woman visit. If no in office availability yet, please schedule for lab only and placet tickler to schedule for in office visit with next schedule. Thanks!

## 2021-03-03 NOTE — PATIENT INSTRUCTIONS
GI Referrals: 
 
[facility] Dr. Alley Cintron MD 
[phone] [facility]. [facility]

## 2021-03-18 ENCOUNTER — HOSPITAL ENCOUNTER (OUTPATIENT)
Dept: MAMMOGRAPHY | Age: 51
Discharge: HOME | End: 2021-03-18
Attending: FAMILY MEDICINE
Payer: COMMERCIAL

## 2021-03-18 DIAGNOSIS — Z12.31 ENCOUNTER FOR SCREENING MAMMOGRAM FOR MALIGNANT NEOPLASM OF BREAST: ICD-10-CM

## 2021-03-18 PROCEDURE — 77063 BREAST TOMOSYNTHESIS BI: CPT

## 2021-08-13 ENCOUNTER — TELEPHONE (OUTPATIENT)
Dept: FAMILY MEDICINE CLINIC | Age: 51
End: 2021-08-13

## 2021-08-13 NOTE — TELEPHONE ENCOUNTER
----- Message from Pheedo sent at 8/13/2021  2:31 PM EDT -----  Regarding: Dr. Nell Boone  General Message/Vendor Calls    Caller's first and last name: Jane Chambers      Reason for call: provide info      Callback required yes/no and why: yes/pt request      Best contact number(s): [phone]      Details to clarify the request: pt wanted to provide info/colonoscopy scheduled 09/24/2021 12:00 pm/pt is requesting a callback to schedule labs as well      Pheedo

## 2021-08-18 ENCOUNTER — LAB ONLY (OUTPATIENT)
Dept: FAMILY MEDICINE CLINIC | Age: 51
End: 2021-08-18

## 2021-08-18 DIAGNOSIS — D64.9 ANEMIA, UNSPECIFIED TYPE: ICD-10-CM

## 2021-08-18 DIAGNOSIS — R79.89 LOW VITAMIN D LEVEL: ICD-10-CM

## 2021-08-18 DIAGNOSIS — E66.09 OBESITY DUE TO EXCESS CALORIES WITHOUT SERIOUS COMORBIDITY, UNSPECIFIED CLASSIFICATION: ICD-10-CM

## 2021-08-18 LAB
25(OH)D3 SERPL-MCNC: 20.3 NG/ML (ref 30–100)
ALBUMIN SERPL-MCNC: 4 G/DL (ref 3.5–5)
ALBUMIN/GLOB SERPL: 1.3 {RATIO} (ref 1.1–2.2)
ALP SERPL-CCNC: 64 U/L (ref 45–117)
ALT SERPL-CCNC: 21 U/L (ref 12–78)
ANION GAP SERPL CALC-SCNC: 3 MMOL/L (ref 5–15)
AST SERPL-CCNC: 14 U/L (ref 15–37)
BILIRUB SERPL-MCNC: 0.3 MG/DL (ref 0.2–1)
BUN SERPL-MCNC: 22 MG/DL (ref 6–20)
BUN/CREAT SERPL: 32 (ref 12–20)
CALCIUM SERPL-MCNC: 9.2 MG/DL (ref 8.5–10.1)
CHLORIDE SERPL-SCNC: 109 MMOL/L (ref 97–108)
CHOLEST SERPL-MCNC: 166 MG/DL
CO2 SERPL-SCNC: 30 MMOL/L (ref 21–32)
CREAT SERPL-MCNC: 0.69 MG/DL (ref 0.55–1.02)
ERYTHROCYTE [DISTWIDTH] IN BLOOD BY AUTOMATED COUNT: 13.9 % (ref 11.5–14.5)
GLOBULIN SER CALC-MCNC: 3.1 G/DL (ref 2–4)
GLUCOSE SERPL-MCNC: 90 MG/DL (ref 65–100)
HCT VFR BLD AUTO: 37.2 % (ref 35–47)
HDLC SERPL-MCNC: 51 MG/DL
HDLC SERPL: 3.3 {RATIO} (ref 0–5)
HGB BLD-MCNC: 11 G/DL (ref 11.5–16)
LDLC SERPL CALC-MCNC: 100.2 MG/DL (ref 0–100)
MCH RBC QN AUTO: 25.5 PG (ref 26–34)
MCHC RBC AUTO-ENTMCNC: 29.6 G/DL (ref 30–36.5)
MCV RBC AUTO: 86.3 FL (ref 80–99)
NRBC # BLD: 0 K/UL (ref 0–0.01)
NRBC BLD-RTO: 0 PER 100 WBC
PLATELET # BLD AUTO: 306 K/UL (ref 150–400)
PMV BLD AUTO: 9.6 FL (ref 8.9–12.9)
POTASSIUM SERPL-SCNC: 4.5 MMOL/L (ref 3.5–5.1)
PROT SERPL-MCNC: 7.1 G/DL (ref 6.4–8.2)
RBC # BLD AUTO: 4.31 M/UL (ref 3.8–5.2)
SODIUM SERPL-SCNC: 142 MMOL/L (ref 136–145)
TRIGL SERPL-MCNC: 74 MG/DL (ref ?–150)
VLDLC SERPL CALC-MCNC: 14.8 MG/DL
WBC # BLD AUTO: 6.2 K/UL (ref 3.6–11)

## 2022-05-20 ENCOUNTER — TRANSCRIBE ORDER (OUTPATIENT)
Dept: SCHEDULING | Age: 52
End: 2022-05-20

## 2022-05-20 DIAGNOSIS — Z12.31 SCREENING MAMMOGRAM FOR HIGH-RISK PATIENT: Primary | ICD-10-CM

## 2022-05-25 ENCOUNTER — TELEPHONE (OUTPATIENT)
Dept: FAMILY MEDICINE CLINIC | Age: 52
End: 2022-05-25

## 2022-05-25 NOTE — TELEPHONE ENCOUNTER
Called patient to schedule an appointment for physical that she requested. LVM for patient to give us a call back so we can schedule.

## 2022-06-28 ENCOUNTER — HOSPITAL ENCOUNTER (OUTPATIENT)
Dept: MAMMOGRAPHY | Age: 52
Discharge: HOME | End: 2022-06-28
Attending: FAMILY MEDICINE
Payer: COMMERCIAL

## 2022-06-28 DIAGNOSIS — Z12.31 SCREENING MAMMOGRAM FOR HIGH-RISK PATIENT: ICD-10-CM

## 2022-06-28 PROCEDURE — 77063 BREAST TOMOSYNTHESIS BI: CPT

## 2022-10-04 ENCOUNTER — TELEPHONE (OUTPATIENT)
Dept: FAMILY MEDICINE CLINIC | Age: 52
End: 2022-10-04

## 2022-10-04 NOTE — TELEPHONE ENCOUNTER
----- Message from Lisa Burk sent at 9/29/2022  3:03 PM EDT -----  Subject: Appointment Request    Reason for Call: Established Patient Appointment needed: Routine Physical   Exam    QUESTIONS    Reason for appointment request? No appointments available during search     Additional Information for Provider?  Patient needs a call back to schedule   a physical.   ---------------------------------------------------------------------------  --------------  Annia STRONG  [phone]; OK to leave message on voicemail  ---------------------------------------------------------------------------  --------------  SCRIPT ANSWERS  COVID Screen: Kirti Willard

## 2022-10-04 NOTE — TELEPHONE ENCOUNTER
Attempted to reach patient to get her scheduled for a physical. Left voicemail for patient to give us a call back to schedule appointment.

## 2022-11-10 ENCOUNTER — OFFICE VISIT (OUTPATIENT)
Dept: FAMILY MEDICINE CLINIC | Age: 52
End: 2022-11-10
Payer: COMMERCIAL

## 2022-11-10 VITALS
WEIGHT: 207 LBS | HEIGHT: 62 IN | DIASTOLIC BLOOD PRESSURE: 82 MMHG | TEMPERATURE: 97.5 F | RESPIRATION RATE: 16 BRPM | HEART RATE: 79 BPM | SYSTOLIC BLOOD PRESSURE: 170 MMHG | OXYGEN SATURATION: 98 % | BODY MASS INDEX: 38.09 KG/M2

## 2022-11-10 DIAGNOSIS — N62 LARGE BREASTS: ICD-10-CM

## 2022-11-10 DIAGNOSIS — E55.9 VITAMIN D DEFICIENCY: ICD-10-CM

## 2022-11-10 DIAGNOSIS — E66.09 CLASS 2 OBESITY DUE TO EXCESS CALORIES WITHOUT SERIOUS COMORBIDITY WITH BODY MASS INDEX (BMI) OF 37.0 TO 37.9 IN ADULT: ICD-10-CM

## 2022-11-10 DIAGNOSIS — Z11.59 NEED FOR HEPATITIS C SCREENING TEST: ICD-10-CM

## 2022-11-10 DIAGNOSIS — R03.0 ELEVATED BLOOD PRESSURE READING IN OFFICE WITHOUT DIAGNOSIS OF HYPERTENSION: ICD-10-CM

## 2022-11-10 DIAGNOSIS — Z00.00 WELL WOMAN EXAM WITHOUT GYNECOLOGICAL EXAM: Primary | ICD-10-CM

## 2022-11-10 PROCEDURE — 99396 PREV VISIT EST AGE 40-64: CPT | Performed by: FAMILY MEDICINE

## 2022-11-10 NOTE — PATIENT INSTRUCTIONS
Matty Garber, MPH, RDN    [place] [address] [facility]  [address] [facility], [address]  [facility] [address], [facility], [address] [facility]  [place] [address], [address] ([address] [facility])  [place], [address]  [phone]

## 2022-11-10 NOTE — PROGRESS NOTES
Subjective:   Maximus Rosenberg is a 58-year-old female with history of fibroids who presents for well woman visit. She notes that her weight has been fluctuating and that she would like to make efforts towards being healthier later on in her life, even if she has had no issues with her current physical abilities. The patient has tried Weight Watchers in the past, and is looking to limit her carbohydrates in the future. Additionally, she states that she may benefit from breast reduction or increased chest muscle strength to alleviate her musculoskeletal pain due to her breasts. She is planning to have her shingles vaccine when she has ample time to overcome any side effects. Lastly, she notes congestion from her seasonal allergies, for which she uses antihistamines and decongestants, or nasal rinse. There is no problem list on file for this patient. There are no problems to display for this patient. No current outpatient medications on file. She states that she takes multivitamins and vitamin D, but has not done so consistently due to upset stomach (for the multivitamins).     Allergies   Allergen Reactions    Amoxicillin Itching     Past Medical History:   Diagnosis Date    Anemia     Fibroids      Past Surgical History:   Procedure Laterality Date    HX CHOLECYSTECTOMY  2007    HX GYN  2003    HX HYSTERECTOMY  03/2014    still has R ovary (not really sure)    HX OOPHORECTOMY  2003     Family History   Problem Relation Age of Onset    Cancer Mother         thymus, esophagus    Cancer Maternal Grandfather         stomach    Cancer Paternal Grandmother      Social History     Tobacco Use    Smoking status: Never    Smokeless tobacco: Never   Substance Use Topics    Alcohol use: Yes     Comment: ocassionally        Lab Results   Component Value Date/Time    WBC 6.2 08/18/2021 08:33 AM    HGB 11.0 (L) 08/18/2021 08:33 AM    HCT 37.2 08/18/2021 08:33 AM    PLATELET 241 87/54/3309 08:33 AM    MCV 86.3 08/18/2021 08:33 AM     Lab Results   Component Value Date/Time    Hemoglobin A1c 5.3 08/03/2018 09:17 AM    Glucose 90 08/18/2021 08:33 AM    LDL, calculated 100.2 (H) 08/18/2021 08:33 AM    Creatinine 0.69 08/18/2021 08:33 AM      Lab Results   Component Value Date/Time    Cholesterol, total 166 08/18/2021 08:33 AM    HDL Cholesterol 51 08/18/2021 08:33 AM    LDL, calculated 100.2 (H) 08/18/2021 08:33 AM    Triglyceride 74 08/18/2021 08:33 AM    CHOL/HDL Ratio 3.3 08/18/2021 08:33 AM     Lab Results   Component Value Date/Time    Glucose 90 08/18/2021 08:33 AM      Lab Results   Component Value Date/Time    Sodium 142 08/18/2021 08:33 AM    Potassium 4.5 08/18/2021 08:33 AM    Chloride 109 (H) 08/18/2021 08:33 AM    CO2 30 08/18/2021 08:33 AM    Anion gap 3 (L) 08/18/2021 08:33 AM    Glucose 90 08/18/2021 08:33 AM    BUN 22 (H) 08/18/2021 08:33 AM    Creatinine 0.69 08/18/2021 08:33 AM    BUN/Creatinine ratio 32 (H) 08/18/2021 08:33 AM    GFR est AA >60 08/18/2021 08:33 AM    GFR est non-AA >60 08/18/2021 08:33 AM    Calcium 9.2 08/18/2021 08:33 AM         Review of Systems   Constitutional:  Negative for chills and fever. HENT:  Positive for congestion (Seasonal allergies. ). Negative for hearing loss and sore throat. Eyes:  Negative for blurred vision and pain. Respiratory:  Negative for cough, shortness of breath and wheezing. Cardiovascular:  Negative for chest pain. Gastrointestinal:  Negative for abdominal pain, diarrhea, nausea and vomiting. Genitourinary:  Negative for dysuria. Musculoskeletal:         Musculoskeletal pain on chest/ribs from breast.   Skin:  Negative for rash. Neurological:  Positive for tingling (In hands when increase in bra strap tightness.) and headaches (Slight, possibly from lack of caffeine. ). Endo/Heme/Allergies:  Negative for environmental allergies. Psychiatric/Behavioral:  Negative for depression. Specific concerns today: See HPI. Objective:    The patient appears well, alert, oriented x 3, in no distress. Visit Vitals  BP (!) 170/82 (BP 1 Location: Right arm, BP Patient Position: Sitting)   Pulse 79   Temp 97.5 °F (36.4 °C) (Temporal)   Resp 16   Ht 5' 2\" (1.575 m)   Wt 207 lb (93.9 kg)   SpO2 98%   BMI 37.86 kg/m²     ENT normal.  Neck supple. No adenopathy or thyromegaly. TANA. Lungs are clear, good air entry, no wheezes, rhonchi or rales. S1 and S2 normal, no murmurs, regular rate and rhythm. Abdomen soft without tenderness, guarding, mass or organomegaly. Extremities show no edema, normal peripheral pulses. Neurological is normal, no focal findings. Breast and Pelvic exams are deferred. Assessment/Plan:   Well Woman  Focus on weight loss, increase physical activity, routine labs ordered. Assessment: Sharyn Wang is a 56-year-old female with history of fibroids who presents for well woman visit. She notes desire for weight loss and potential breast reduction. She is planning on receiving her shingles vaccine when she finds appropriate time and is otherwise up to date on vaccinations. She needs Hep C screening and release of records for OB/GYN history to determine if she needs a pap smear. On physical exam, she had no concerning findings. ICD-10-CM ICD-9-CM    1. Well woman exam without gynecological exam  Z00.00 V70.0 CBC W/O DIFF      METABOLIC PANEL, BASIC      LIPID PANEL      HEMOGLOBIN A1C WITH EAG      2. Elevated blood pressure reading in office without diagnosis of hypertension  R03.0 796.2       3. Large breasts  N62 611.1       4. Class 2 obesity due to excess calories without serious comorbidity with body mass index (BMI) of 37.0 to 37.9 in adult  E66.09 278.00 REFERRAL TO NUTRITION    Z68.37 V85.37       5.  Vitamin D deficiency  E55.9 268.9 VITAMIN D, 25 HYDROXY      6. Need for hepatitis C screening test  Z11.59 V73.89 HEPATITIS C AB        Plan:   Screening for Hepatitis C  Await OB/GYN records to determine if she needs a pap smear  Refer to a nutritionist for feedback on diet and exercise  Lifestyle Medicine counseling with Dr. Deon Vaca

## 2022-11-10 NOTE — PROGRESS NOTES
HPI:  Azeem Barrera is a 49 y.o. female presenting for well woman exam.     Acute complaints:   - Difficulty with weight loss. Tried Weight Watchers in the past.     - Worries about vitamin deficiencies. - C/o breast pain and results in shoulder pain. Tight bra straps causes tinging in her hands. Exercise: Walking her dog daily. Stretching more. Diet: Working on limiting carbs. GYN: S/p hysterectomy in  for fibroids. Still has her cervix per pt. Normal paps. . Followed by GYN. Psych: Denies feelings of depressed mood. Followed by a therapist, things going very well. 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 Good support form sister, friends and [employer]. Works as a pharmacist.     Health Maintenance - reviewed:  Pap (age 21-65): S/p hysterectomy for fibroids. Still has her cervix. Normal paps per pt, last one being in 2019. Previously followed by GYN but they recently left the practice. Mammogram (age 54-69): 2022; normal.     Colonoscopy (age 54-65):  - Normal with 10 yr follow up. DEXA (>/= 73 yo or sooner with RF): Not yet indicated    HIV screening: Declined      Allergies- reviewed: Allergies   Allergen Reactions    Amoxicillin Itching       Medications- reviewed:   No current outpatient medications on file. No current facility-administered medications for this visit.          Past Medical History- reviewed:  Past Medical History:   Diagnosis Date    Anemia     Fibroids          Past Surgical History- reviewed:   Past Surgical History:   Procedure Laterality Date    HX CHOLECYSTECTOMY      HX GYN      HX HYSTERECTOMY  2014    still has R ovary (not really sure)    HX OOPHORECTOMY           Social History- reviewed:  Social History     Socioeconomic History    Marital status: SINGLE     Spouse name: Not on file    Number of children: Not on file    Years of education: Not on file    Highest education level: Not on file   Occupational History    Not on file   Tobacco Use    Smoking status: Never    Smokeless tobacco: Never   Vaping Use    Vaping Use: Never used   Substance and Sexual Activity    Alcohol use: Yes     Comment: ocassionally    Drug use: No    Sexual activity: Not Currently     Birth control/protection: Surgical   Other Topics Concern    Not on file   Social History Narrative    Not on file     Social Determinants of Health     Financial Resource Strain: Not on file   Food Insecurity: Not on file   Transportation Needs: Not on file   Physical Activity: Not on file   Stress: Not on file   Social Connections: Not on file   Intimate Partner Violence: Not on file   Housing Stability: Not on file         Immunizations- reviewed:   Immunization History   Administered Date(s) Administered    COVID-19, J&J, (age 18y+), IM, 0.5mL 04/05/2021    COVID-19, MODERNA BLUE border, Primary or Immunocompromised, (age 18y+), IM, 100 mcg/0.5mL 11/19/2021, 04/28/2022    COVID-19, MODERNA Bivalent BOOSTER, (age 18y+), IM, 50 mcg/0.5 mL 10/17/2022    Influenza Vaccine 10/12/2015, 10/01/2020, 10/17/2022    Tdap 08/14/2014     Review of systems:  Items bolded if positive. Constitutional: Fever, chills, night sweats, weight loss, lymphadenopathy, fatigue  HEENT: Vision change, eye pain, rhinorrhea, sinus pain, epistaxis, dysphagia, change in hearing, tinnitus, vertigo.    Endocrine: Weight change, heat/ cold intolerance, tremor, insomnia, polyuria, polydipsia, polyphagia, abnl hair growth, nail changes  Cardiovascular: Chest pain, palpitations, syncope, lower extremity edema, orthopnea, paroxysmal nocturnal dyspnea  Pulmonary: Shortness of breath, dyspnea on exertion, cough, hemoptysis, wheezing  GI: Nausea, vomiting, diarrhea, melena, hematochezia, change in appetite, abdominal pain, change in bowel habits or stools  : Dysuria, frequency, urgency, incontinence, hematuria, nocturia  Musculoskeletal: joint swelling or pain, muscle pain, back pain  Skin: Rash, New/growing/changing skin lesions  Neurologic: Headache, muscle weakness, paresthesias, anesthesia, ataxia, change in speech, change in gait   Psychiatric: depression, anxiety, hallucinations, sy, SI/HI      Physical Exam  Visit Vitals  BP (!) 170/82 (BP 1 Location: Right arm, BP Patient Position: Sitting)   Pulse 79   Temp 97.5 °F (36.4 °C) (Temporal)   Resp 16   Ht 5' 2\" (1.575 m)   Wt 207 lb (93.9 kg)   SpO2 98%   BMI 37.86 kg/m²       General appearance - alert, well appearing, and in no distress and overweight  Neck - supple, no significant adenopathy, thyroid exam: thyroid is normal in size without nodules or tenderness  Chest - clear to auscultation, no wheezes, rales or rhonchi, symmetric air entry  Heart - normal rate, regular rhythm, normal S1, S2, no murmurs, rubs, clicks or gallops  Abdomen - soft, nontender, nondistended, no masses or organomegaly  Neurological - alert, oriented, normal speech, no focal findings or movement disorder noted  Musculoskeletal - no joint tenderness, deformity or swelling  Extremities - peripheral pulses normal, no pedal edema, no clubbing or cyanosis  Skin - normal coloration and turgor, no rashes, no suspicious skin lesions noted      Assessment/Plan:   Ms. Becca Lacy is a 49 y.o. female presenting for [facility] woman health maintenance visit. Counseled on importance of healthy diet, regular exercise, healthy lifestyle (i.e. Safe sex practices, seatbelt safety, wearing sunscreen, etc.)    Will get prior GYN records for last pap. She is s/p hysterectomy for fibroids but still has her cervix. Will need to continue routine cervical cancer screening. Will determine screening intervals when pap records reviewed. Mammogram annually    Colonoscopy done 9/2021. Next due 2031. Scanned into media, will have it uploaded to . Labs ordered. Included Vit D level. Hep C screening as well. Elevated BP reading in office; new. Pt will follow up for BP check.  Suspect stressors from the day are at play. BMI 37. Discussed weight loss options, including medication options. She would like to work on lifestyle in order to manage weight. Referred to Dr. Jung Nuñez for lifestyle medicine. Follow-up: Return for yearly wellness visits      Orders Placed This Encounter    CBC W/O DIFF     Standing Status:   Future     Standing Expiration Date:   11/05/1591    METABOLIC PANEL, BASIC     Standing Status:   Future     Standing Expiration Date:   11/10/2023    LIPID PANEL     Standing Status:   Future     Standing Expiration Date:   11/10/2023    HEMOGLOBIN A1C WITH EAG     Standing Status:   Future     Standing Expiration Date:   11/10/2023    VITAMIN D, 25 HYDROXY     Standing Status:   Future     Standing Expiration Date:   11/10/2023    HEPATITIS C AB     Standing Status:   Future     Standing Expiration Date:   11/10/2023    REFERRAL TO NUTRITION     Referral Priority:   Routine     Referral Type:   Consultation     Referral Reason:   Specialty Services Required     Number of Visits Requested:   1     I have discussed the diagnosis with the patient and the intended plan as seen in the above orders. The patient has received an after-visit summary and questions were answered concerning future plans. Informed pt to return to the office if new symptoms arise.       Marcell Davidson MD

## 2022-11-10 NOTE — PROGRESS NOTES
Clary Harrison is a 49 y.o. female    Chief Complaint   Patient presents with    Complete Physical         1. Have you been to the ER, urgent care clinic since your last visit? Hospitalized since your last visit? No  2. Have you seen or consulted any other health care providers outside of the [address] [place] [address] since your last visit? Include any pap smears or colon screening.  No    Visit Vitals  BP (!) 170/82 (BP 1 Location: Right arm, BP Patient Position: Sitting)   Pulse 79   Temp 97.5 °F (36.4 °C) (Temporal)   Resp 16   Ht 5' 2\" (1.575 m)   Wt 207 lb (93.9 kg)   SpO2 98%   BMI 37.86 kg/m²     3 most recent PHQ Screens 11/10/2022   Little interest or pleasure in doing things Not at all   Feeling down, depressed, irritable, or hopeless Not at all   Total Score PHQ 2 0     Health Maintenance Due   Topic Date Due    Hepatitis C Screening  Never done    Colorectal Cancer Screening Combo  Never done    Shingrix Vaccine Age 50> (1 of 2) Never done     Colonoscopy:   - last year on [facility] repeat in 5 year     Skin moles,     Hydration unsure if there any deficiencies due to occupational

## 2022-12-02 ENCOUNTER — OFFICE VISIT (OUTPATIENT)
Dept: FAMILY MEDICINE CLINIC | Age: 52
End: 2022-12-02
Payer: COMMERCIAL

## 2022-12-02 VITALS
DIASTOLIC BLOOD PRESSURE: 84 MMHG | SYSTOLIC BLOOD PRESSURE: 142 MMHG | TEMPERATURE: 97.1 F | WEIGHT: 202 LBS | BODY MASS INDEX: 37.17 KG/M2 | RESPIRATION RATE: 17 BRPM | HEART RATE: 82 BPM | OXYGEN SATURATION: 99 % | HEIGHT: 62 IN

## 2022-12-02 DIAGNOSIS — E66.09 CLASS 2 OBESITY DUE TO EXCESS CALORIES WITHOUT SERIOUS COMORBIDITY WITH BODY MASS INDEX (BMI) OF 36.0 TO 36.9 IN ADULT: ICD-10-CM

## 2022-12-02 DIAGNOSIS — R03.0 ELEVATED BLOOD PRESSURE READING IN OFFICE WITHOUT DIAGNOSIS OF HYPERTENSION: Primary | ICD-10-CM

## 2022-12-02 PROCEDURE — 99215 OFFICE O/P EST HI 40 MIN: CPT | Performed by: STUDENT IN AN ORGANIZED HEALTH CARE EDUCATION/TRAINING PROGRAM

## 2022-12-02 NOTE — PATIENT INSTRUCTIONS
Food as Medicine Jumpstart Guide: https://lifestylemedicine.org/wp-content/uploads/2022/07/AC[facility]-Food-As-Medicine-Jumpstart-8.5x11.pdf    Eating on a Budget - One week meal plan:  https://lifestylemedicine.org/[email or web address]/uploads/2022/07/Eating-on-Budget. pdf      Other handouts provided: 6 Pillars of Lifestyle Medicine and Super Foods

## 2022-12-02 NOTE — PROGRESS NOTES
Morena Daley is an 49 y.o. female who presents for initial lifestyle medicine visit. /84; 130s at work     Tell us some about your basic goals for coming to this clinic:   What are your main goals? Weight loss, looking for different tools. 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 Daughter is at college in her 2nd year; comes home for [employer] and splits time between father and mom  Who lives with you? Empty nakul, dog    Briefly talk through the six domains of health living  SLEEP: (h/o sleep apnea? Tired in the morning? Wake up in the middle of the night? Screen time before bed? Regular schedule? Sleeping extra on the weekend? nocturia?)   Sleep - between 10:30 and 11 depending on when she gets home from work. Some days 11:30  Wakes up 6-6:30 (alarm at 5:30) - with schedule change   Sleeps well, gets good sleep  Does feel well rested when she wakes   Gets about 7 hours of sleep  Does snore, never waking up needing to catch breath   Does have an automatic timer on TV and falls asleep during this  No computers or cell phone      STRESS MANAGEMENT: (h/o depression/anxiety? Ever meditated? Are you in therapy or counselling? Meds for depression? hospitalizations?)     Work stress, very understaffed, more distractions because of this; focus is difficult, looking out for colleagues   Trying to think about how she can thrive in this role; does have a different schedule  Trying to figure out breaks at work to eat and drink water    No history of depression or anxiety  Has had episodes of stress at work that took a toll on her home life  Does see a therapist    Adam Johnson is important  Tries to pray before work     SOCIAL SUPPORT: (who do you call when you need help? Do you feel connected?  Do you feel lonely?)   Calls her older sister   Calls friends from [employer]   Daughter will call and check in   Does feel connected   Doesn't feel lonely   Belongs to a [employer], serves there in outreach and connect teams; has a small group during the week     PHYS ACTIVITY: (How would you describe your activity level? Barriers to exercise? Current exercise routine?)   Level right now is \"low\"  Dog walking - walk around the neighborhood, 20-40 minutes on days off; if pressed for time it's about 15 minutes once a day  Would like to go on her own walks  Does enjoy being outside  Hopefully can have time in the day     Time is main barrier    Previously: has had gym memberships, personal trainers. Does enjoy working out; has weights at home, enjoys classes     NUTRITION/DIET: (previous weight loss program, diets, barriers to dietary changes, food allergies, percent of meals cooked at home, if goal is weight loss:  hx of eating disorders; highest weight, lowest weight)     Highest adult weight 207lbs  Lowest adult weight 168lbs   No history of eating disorders    Has done weight watchers several times, never got to where she wanted to be   Several years since she has done weight watchers   No other programs   No food allergies   Meals cooked at home - not much because it's just her. Take out is easier. Hoping to improve and pack a lunch for work     Breakfast - sausage and egg croissant, sausage biscuits; if time will cook, adds spinach  Work lunch - frozen meal; pre-packaged mally chicken salad, hot bar  Dinner - chicken mally + grapes; sometimes mcdonalds, blaze pizza; mostly take out after she is done with work     SUBSTANCE ABUSE:(Smoking, Alcohol Use,Illicit drug use?)   Smoking - never  Etoh - None  Recreational - none    Allergies - reviewed: Allergies   Allergen Reactions    Amoxicillin Itching         Medications - reviewed:   No current outpatient medications on file. No current facility-administered medications for this visit.          Past Medical History - reviewed:  Past Medical History:   Diagnosis Date    Anemia     Fibroids          Past Surgical History - reviewed:   Past Surgical History:   Procedure Laterality Date    HX CHOLECYSTECTOMY  2007    HX GYN  2003    HX HYSTERECTOMY  03/2014    still has R ovary (not really sure)    HX OOPHORECTOMY  2003       Physical Exam  Visit Vitals  BP (!) 142/84   Pulse 82   Temp 97.1 °F (36.2 °C) (Temporal)   Resp 17   Ht 5' 2\" (1.575 m)   Wt 202 lb (91.6 kg)   SpO2 99%   BMI 36.95 kg/m²       General: No acute distress. HEENT: Conjunctiva are clear, sclera non-icteric. Respiratory: Normal work of breathing, talking in full sentences without issue  Musculoskeletal: Normal gait. Skin: No abnormalities or rashes   Neuro: Alert, oriented, speech clear and coherent  Psychiatric: Normal mood and affect        Assessment/Plan    1. Elevated blood pressure reading in office without diagnosis of hypertension    2. Class 2 obesity due to excess calories without serious comorbidity with body mass index (BMI) of 36.0 to 36.9 in adult    We discussed the lifestyle changes that we would like to make. We discussed that the main goal of this program is to change lifestyle behaviors permanently in order to improve overall health. This is going to involve frequent follow-up, motivation to change, and adherence to a plan. She will take her blood pressure at home over the next month and bring in a log to review at the next visit. She will work on goals in Nutrition and Activity over the next month. Nutrition: Pack a lunch or  fresh ingredients at the store. Incorporate more \"superfoods\" into meals. Activity: One additional walk in addition to walking the dog. Follow-up and Dispositions    Return in about 4 weeks (around 12/30/2022) for Lifestyle Medicine Follow up visit Barrera 30 minutes.        Patient encounter was at > 40 minutes of total time spend on the date of the encounter: preparing to see the patient(reviewing prior notes and tests), obtaining and/or reviewing separately obtained history, performing a medially appropriate examination and/or evaluation, counseling and educating the patient/family/caregiver, documenting clinical information in the electronic or other health record, independently interpreting results(not separately reported) and care coordination. I have discussed the diagnosis with the patient and the intended plan as seen in the above orders. Patient verbalized understanding of the plan and agrees with the plan. The patient has received an after-visit summary and questions were answered concerning future plans. I have discussed medication side effects and warnings with the patient as well. Informed patient to return to the office if new symptoms arise.         Waldo Duverney, MD

## 2022-12-02 NOTE — PROGRESS NOTES
Chief Complaint   Patient presents with    Weight Management     1. Have you been to the ER, urgent care clinic since your last visit? Hospitalized since your last visit? No  2. Have you seen or consulted any other health care providers outside of the [address] [place] [address] since your last visit? Include any pap smears or colon screening.  No

## 2022-12-30 ENCOUNTER — OFFICE VISIT (OUTPATIENT)
Dept: FAMILY MEDICINE CLINIC | Age: 52
End: 2022-12-30
Payer: COMMERCIAL

## 2022-12-30 VITALS
BODY MASS INDEX: 37.13 KG/M2 | HEART RATE: 71 BPM | DIASTOLIC BLOOD PRESSURE: 82 MMHG | SYSTOLIC BLOOD PRESSURE: 137 MMHG | OXYGEN SATURATION: 99 % | TEMPERATURE: 98.3 F | WEIGHT: 203 LBS

## 2022-12-30 DIAGNOSIS — R03.0 ELEVATED BLOOD PRESSURE READING IN OFFICE WITHOUT DIAGNOSIS OF HYPERTENSION: ICD-10-CM

## 2022-12-30 DIAGNOSIS — E66.09 CLASS 2 OBESITY DUE TO EXCESS CALORIES WITHOUT SERIOUS COMORBIDITY WITH BODY MASS INDEX (BMI) OF 36.0 TO 36.9 IN ADULT: Primary | ICD-10-CM

## 2022-12-30 NOTE — PROGRESS NOTES
Identified pt with two pt identifiers(name and ). Reviewed record in preparation for visit and have obtained necessary documentation. All patient medications has been reviewed. Chief Complaint   Patient presents with    Follow-up     Routine follow up visit      Visit Vitals  /82   Pulse 71   Temp 98.3 °F (36.8 °C) (Oral)   Wt 203 lb (92.1 kg)   SpO2 99%   BMI 37.13 kg/m²     1. Have you been to the ER, urgent care clinic since your last visit? Hospitalized since your last visit? No    2. Have you seen or consulted any other health care providers outside of the [address] [place] [address] since your last visit? Include any pap smears or colon screening.  No

## 2022-12-30 NOTE — PROGRESS NOTES
SUBJECTIVE:    Reviewed Goals from last viist    Blood pressure: Has not taken her blood pressure at home, didn't get cuff yet    Had made a goal for Nutrition: Pack a lunch or  fresh ingredients at the store. Incorporate more \"superfoods\" into meals. Reports she did pack a lunch for the two weeks after we met 3/5 days a week! Bought beans and incorporated more foods  When she doesn't, gets a subway sandwich  Trying to limit going out for lunch    Had made an activity goal: One additional walk in addition to walking the dog. Initially after we met walked her dog a few times a week, but this has fallen off in the last few weeks    History from first visit reviewed:    Main goals for LM:  Weight loss, looking for different tools. Focus is not a number but wants to stay healthy    Work: [employer] - extremely stressful   Kids:  Daughter is at college in her 2nd year; comes home for [employer] and splits time between father and mom  At home: Empty nakul, dog      SLEEP:  Sleep - between 10:30 and 11 depending on when she gets home from work.  Some days 11:30  Wakes up 6-6:30 (alarm at 5:30) - with schedule change   Sleeps well, gets good sleep  Does feel well rested when she wakes   Gets about 7 hours of sleep  Does snore, never waking up needing to catch breath   Does have an automatic timer on TV and falls asleep during this  No computers or cell phone      STRESS MANAGEMENT:   #Work stress, very understaffed, more distractions because of this; focus is difficult, looking out for colleagues   Trying to think about how she can thrive in this role; does have a different schedule  Trying to figure out breaks at work to eat and drink water    No history of depression or anxiety  Has had episodes of stress at work that took a toll on her home life  Does see a therapist  Dane Bright is important  Tries to pray before work     SOCIAL SUPPORT:  Calls her older sister   Calls friends from [employer] will call and check in   Does feel connected   Doesn't feel lonely   Belongs to a [employer], 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 Does enjoy working out; has weights at home, enjoys classes     NUTRITION/DIET:   Highest adult weight 207lbs  Lowest adult weight 168lbs   No history of eating disorders    Has done weight watchers several times, never got to where she wanted to be   Several years since she has done weight watchers   No other programs   No food allergies   Meals cooked at home - not much because it's just her. Take out is easier. Hoping to improve and pack a lunch for work     SUBSTANCE ABUSE:(Smoking, Alcohol Use,Illicit drug use?)   Smoking - never  Etoh - None  Recreational - none    OBJECTIVE  Visit Vitals  /82   Pulse 71   Temp 98.3 °F (36.8 °C) (Oral)   Wt 203 lb (92.1 kg)   SpO2 99%   BMI 37.13 kg/m²     General: No acute distress. HEENT: Conjunctiva are clear, sclera non-icteric. Respiratory: Normal work of breathing, talking in full sentences without issue  Musculoskeletal: Normal gait. Skin: No abnormalities or rashes   Neuro: Alert, oriented, speech clear and coherent  Psychiatric: Normal mood and affect    ASSESSMENT AND PLAN  Diagnoses and all orders for this visit:    1. Class 2 obesity due to excess calories without serious comorbidity with body mass index (BMI) of 36.0 to 36.9 in adult    2. Elevated blood pressure reading in office without diagnosis of hypertension    Blood pressure improved in office today. Anticipate continued improvement with changes.      EXERCISE GOAL:   Action Plan (Be Specific and Measureable): Stretching, brisk walk, weights  Start with 3 days a week, work up to daily    Patients Readiness Level for the Action Plan:  Preparation - takes a lot of planning with her schedule  Patients Confidence Level that change can be made : Tricky, because of the boundaries and time; Largest Barriers to sticking with the action plan: time, spends a lot of time at work to be helpful  Solution to overcoming the barrier: Valuing personal time, Make flashcards with activity, be flexible but set boundaries at work     NUTRITION GOAL  Action Plan (Be Specific and Measureable): Buy the salads at work and bring a protein to add, limit the dressing. Will try to do this the three days of the week that she works late. Will need to grocery shop, meal prep, have the storage containers. Patients Readiness Level for the Action Plan: Preparation/Action  Patients Confidence Level that change can be made : 10, because already making plans in her head to start acting today  Largest Barriers to sticking with the action plan: Going to the grocery store and what time that works; changes in schedule  Solution to overcoming the barrier: Go to grocery store on Sundays, plan for some of the interruptions that she knows will happen   Daily affirmations     Will complete the Licha   Will keep a food journal for review next visit    Patient encounter was >30 minutes was spent of total time spent on the date of the encounter: preparing to see the patient(reviewing prior notes and tests), obtaining and/or reviewing separately obtained history, performing a medially appropriate examination and/or evaluation, counseling and educating the patient/family/caregiver, documenting clinical information in the electronic or other health record.

## 2023-07-31 ENCOUNTER — OFFICE VISIT (OUTPATIENT)
Age: 53
End: 2023-07-31
Payer: COMMERCIAL

## 2023-07-31 VITALS
BODY MASS INDEX: 36.25 KG/M2 | HEIGHT: 63 IN | RESPIRATION RATE: 18 BRPM | TEMPERATURE: 98.7 F | OXYGEN SATURATION: 98 % | SYSTOLIC BLOOD PRESSURE: 127 MMHG | WEIGHT: 204.6 LBS | DIASTOLIC BLOOD PRESSURE: 69 MMHG | HEART RATE: 81 BPM

## 2023-07-31 DIAGNOSIS — E66.9 CLASS 2 OBESITY WITHOUT SERIOUS COMORBIDITY WITH BODY MASS INDEX (BMI) OF 36.0 TO 36.9 IN ADULT, UNSPECIFIED OBESITY TYPE: ICD-10-CM

## 2023-07-31 DIAGNOSIS — I10 HYPERTENSION, UNSPECIFIED TYPE: Primary | ICD-10-CM

## 2023-07-31 PROCEDURE — 3078F DIAST BP <80 MM HG: CPT | Performed by: FAMILY MEDICINE

## 2023-07-31 PROCEDURE — 3074F SYST BP LT 130 MM HG: CPT | Performed by: FAMILY MEDICINE

## 2023-07-31 PROCEDURE — 99213 OFFICE O/P EST LOW 20 MIN: CPT

## 2023-07-31 RX ORDER — LOSARTAN POTASSIUM 25 MG/1
25 TABLET ORAL DAILY
Qty: 90 TABLET | Refills: 0 | Status: SHIPPED | OUTPATIENT
Start: 2023-07-31

## 2023-07-31 RX ORDER — VALSARTAN 80 MG/1
80 TABLET ORAL DAILY
Qty: 90 TABLET | Refills: 0 | Status: SHIPPED | OUTPATIENT
Start: 2023-07-31

## 2023-07-31 SDOH — ECONOMIC STABILITY: HOUSING INSECURITY
IN THE LAST 12 MONTHS, WAS THERE A TIME WHEN YOU DID NOT HAVE A STEADY PLACE TO SLEEP OR SLEPT IN A SHELTER (INCLUDING NOW)?: NO

## 2023-07-31 SDOH — ECONOMIC STABILITY: FOOD INSECURITY: WITHIN THE PAST 12 MONTHS, YOU WORRIED THAT YOUR FOOD WOULD RUN OUT BEFORE YOU GOT MONEY TO BUY MORE.: NEVER TRUE

## 2023-07-31 SDOH — ECONOMIC STABILITY: FOOD INSECURITY: WITHIN THE PAST 12 MONTHS, THE FOOD YOU BOUGHT JUST DIDN'T LAST AND YOU DIDN'T HAVE MONEY TO GET MORE.: NEVER TRUE

## 2023-07-31 SDOH — ECONOMIC STABILITY: INCOME INSECURITY: HOW HARD IS IT FOR YOU TO PAY FOR THE VERY BASICS LIKE FOOD, HOUSING, MEDICAL CARE, AND HEATING?: NOT HARD AT ALL

## 2023-07-31 ASSESSMENT — ANXIETY QUESTIONNAIRES
1. FEELING NERVOUS, ANXIOUS, OR ON EDGE: 1
6. BECOMING EASILY ANNOYED OR IRRITABLE: 2
GAD7 TOTAL SCORE: 3
5. BEING SO RESTLESS THAT IT IS HARD TO SIT STILL: 0
7. FEELING AFRAID AS IF SOMETHING AWFUL MIGHT HAPPEN: 0
4. TROUBLE RELAXING: 0
3. WORRYING TOO MUCH ABOUT DIFFERENT THINGS: 0
2. NOT BEING ABLE TO STOP OR CONTROL WORRYING: 0
IF YOU CHECKED OFF ANY PROBLEMS ON THIS QUESTIONNAIRE, HOW DIFFICULT HAVE THESE PROBLEMS MADE IT FOR YOU TO DO YOUR WORK, TAKE CARE OF THINGS AT HOME, OR GET ALONG WITH OTHER PEOPLE: SOMEWHAT DIFFICULT

## 2023-07-31 ASSESSMENT — PATIENT HEALTH QUESTIONNAIRE - PHQ9
SUM OF ALL RESPONSES TO PHQ QUESTIONS 1-9: 3
SUM OF ALL RESPONSES TO PHQ QUESTIONS 1-9: 3
5. POOR APPETITE OR OVEREATING: 0
3. TROUBLE FALLING OR STAYING ASLEEP: 0
SUM OF ALL RESPONSES TO PHQ QUESTIONS 1-9: 3
2. FEELING DOWN, DEPRESSED OR HOPELESS: 1
4. FEELING TIRED OR HAVING LITTLE ENERGY: 1
SUM OF ALL RESPONSES TO PHQ QUESTIONS 1-9: 3
10. IF YOU CHECKED OFF ANY PROBLEMS, HOW DIFFICULT HAVE THESE PROBLEMS MADE IT FOR YOU TO DO YOUR WORK, TAKE CARE OF THINGS AT HOME, OR GET ALONG WITH OTHER PEOPLE: 0
7. TROUBLE CONCENTRATING ON THINGS, SUCH AS READING THE NEWSPAPER OR WATCHING TELEVISION: 0
6. FEELING BAD ABOUT YOURSELF - OR THAT YOU ARE A FAILURE OR HAVE LET YOURSELF OR YOUR FAMILY DOWN: 0
9. THOUGHTS THAT YOU WOULD BE BETTER OFF DEAD, OR OF HURTING YOURSELF: 0
SUM OF ALL RESPONSES TO PHQ9 QUESTIONS 1 & 2: 2
8. MOVING OR SPEAKING SO SLOWLY THAT OTHER PEOPLE COULD HAVE NOTICED. OR THE OPPOSITE, BEING SO FIGETY OR RESTLESS THAT YOU HAVE BEEN MOVING AROUND A LOT MORE THAN USUAL: 0
1. LITTLE INTEREST OR PLEASURE IN DOING THINGS: 1

## 2023-08-01 LAB
CHOLEST SERPL-MCNC: 156 MG/DL
HDLC SERPL-MCNC: 52 MG/DL
HDLC SERPL: 3 (ref 0–5)
LDLC SERPL CALC-MCNC: 87.2 MG/DL (ref 0–100)
TRIGL SERPL-MCNC: 84 MG/DL
VLDLC SERPL CALC-MCNC: 16.8 MG/DL

## 2023-08-03 ENCOUNTER — HOSPITAL ENCOUNTER (OUTPATIENT)
Facility: HOSPITAL | Age: 53
Discharge: HOME | End: 2023-08-03
Attending: FAMILY MEDICINE
Payer: COMMERCIAL

## 2023-08-03 VITALS — HEIGHT: 63 IN | WEIGHT: 200 LBS | BODY MASS INDEX: 35.44 KG/M2

## 2023-08-03 DIAGNOSIS — Z12.31 VISIT FOR SCREENING MAMMOGRAM: ICD-10-CM

## 2023-08-03 PROCEDURE — 77063 BREAST TOMOSYNTHESIS BI: CPT

## 2023-08-03 NOTE — PROGRESS NOTES
I reviewed with the resident the medical history and the resident's findings on the physical examination. I discussed with the resident the patient's diagnosis and concur with the plan.      Alin Syed MD 8/3/2023

## 2023-08-04 LAB
METANEPH FREE SERPL-MCNC: 33.9 PG/ML (ref 0–88)
NORMETANEPHRINE SERPL-MCNC: 121.4 PG/ML (ref 0–244)

## 2023-09-01 ENCOUNTER — OFFICE VISIT (OUTPATIENT)
Age: 53
End: 2023-09-01
Payer: COMMERCIAL

## 2023-09-01 VITALS
DIASTOLIC BLOOD PRESSURE: 79 MMHG | BODY MASS INDEX: 36.82 KG/M2 | OXYGEN SATURATION: 98 % | HEIGHT: 63 IN | RESPIRATION RATE: 16 BRPM | HEART RATE: 86 BPM | WEIGHT: 207.8 LBS | SYSTOLIC BLOOD PRESSURE: 138 MMHG

## 2023-09-01 DIAGNOSIS — I10 PRIMARY HYPERTENSION: Primary | ICD-10-CM

## 2023-09-01 DIAGNOSIS — E55.9 VITAMIN D DEFICIENCY, UNSPECIFIED: ICD-10-CM

## 2023-09-01 PROCEDURE — 99213 OFFICE O/P EST LOW 20 MIN: CPT | Performed by: FAMILY MEDICINE

## 2023-09-01 PROCEDURE — 3075F SYST BP GE 130 - 139MM HG: CPT | Performed by: FAMILY MEDICINE

## 2023-09-01 PROCEDURE — 3078F DIAST BP <80 MM HG: CPT | Performed by: FAMILY MEDICINE

## 2023-09-01 SDOH — ECONOMIC STABILITY: FOOD INSECURITY: WITHIN THE PAST 12 MONTHS, YOU WORRIED THAT YOUR FOOD WOULD RUN OUT BEFORE YOU GOT MONEY TO BUY MORE.: OFTEN TRUE

## 2023-09-01 SDOH — ECONOMIC STABILITY: INCOME INSECURITY: HOW HARD IS IT FOR YOU TO PAY FOR THE VERY BASICS LIKE FOOD, HOUSING, MEDICAL CARE, AND HEATING?: NOT HARD AT ALL

## 2023-09-01 SDOH — ECONOMIC STABILITY: FOOD INSECURITY: WITHIN THE PAST 12 MONTHS, THE FOOD YOU BOUGHT JUST DIDN'T LAST AND YOU DIDN'T HAVE MONEY TO GET MORE.: OFTEN TRUE

## 2023-09-01 ASSESSMENT — PATIENT HEALTH QUESTIONNAIRE - PHQ9
2. FEELING DOWN, DEPRESSED OR HOPELESS: 0
SUM OF ALL RESPONSES TO PHQ QUESTIONS 1-9: 0
SUM OF ALL RESPONSES TO PHQ9 QUESTIONS 1 & 2: 0
1. LITTLE INTEREST OR PLEASURE IN DOING THINGS: 0
SUM OF ALL RESPONSES TO PHQ QUESTIONS 1-9: 0

## 2023-09-01 NOTE — PROGRESS NOTES
History of Present Illness:     Chief Complaint   Patient presents with    Hypertension     Follow Up - Started Losartan 25mg, denies side effect or reaction to medicaiton       Chelle Kilpatrick is a 47 y.o. female     HTN follow up  Currently on Losartan 25mg   Home BP logs are mostly 110-120s/70s  Denies CP, SOB, palpitations    Works as a pharmacist  Very stressful job    PMH (REVIEWED):  Past Medical History:   Diagnosis Date    Anemia     Fibroids        Current Medications/Allergies (REVIEWED):     Current Outpatient Medications on File Prior to Visit   Medication Sig Dispense Refill    losartan (COZAAR) 25 MG tablet Take 1 tablet by mouth daily 90 tablet 0    valsartan (DIOVAN) 80 MG tablet Take 1 tablet by mouth daily (Patient not taking: Reported on 9/1/2023) 90 tablet 0     No current facility-administered medications on file prior to visit. Allergies   Allergen Reactions    Amoxicillin Itching         Review of Systems:     Review of Systems   Cardiovascular:  Negative for chest pain, palpitations and leg swelling. Objective:     Vitals:    09/01/23 1544   Weight: 207 lb 12.8 oz (94.3 kg)   Height: 5' 3\" (1.6 m)       Physical Exam:  General appearance - alert, well appearing, and in no distress  Chest - clear to auscultation, no wheezes, rales or rhonchi, symmetric air entry  Heart - normal rate, regular rhythm, normal S1, S2, no murmurs, rubs, clicks or gallops  Extremities - peripheral pulses normal, no pedal edema, no clubbing or cyanosis    Recent Labs:  No results found for this or any previous visit (from the past 12 hour(s)). Assessment and Plan:      Diagnosis Orders   1. Primary hypertension  Basic Metabolic Panel      2.  Vitamin D deficiency, unspecified  Vitamin D 25 Hydroxy          HTN, improved  Will continue Losartan 25mg daily  Continue home BP checks  BMP ordered    Check vitamin D when she comes for BMP    Follow up: 3 months    Chun Vergara MD    We discussed the

## 2023-09-01 NOTE — PROGRESS NOTES
Chief Complaint   Patient presents with    Hypertension     Follow Up - Started Losartan 25mg, denies side effect or reaction to medicaiton     Vitals:    09/01/23 1544   BP: 138/79   Site: Right Upper Arm   Position: Sitting   Cuff Size: Large Adult   Pulse: 86   Resp: 16   SpO2: 98%   Weight: 207 lb 12.8 oz (94.3 kg)   Height: 5' 3\" (1.6 m)     1. Have you been to the ER, urgent care clinic since your last visit? Hospitalized since your last visit? No    2. Have you seen or consulted any other health care providers outside of the [address] [place] [address] since your last visit? Include any pap smears or colon screening.  No

## 2023-10-23 DIAGNOSIS — I10 HYPERTENSION, UNSPECIFIED TYPE: ICD-10-CM

## 2023-10-23 RX ORDER — LOSARTAN POTASSIUM 25 MG/1
25 TABLET ORAL DAILY
Qty: 90 TABLET | Refills: 0 | Status: SHIPPED | OUTPATIENT
Start: 2023-10-23

## 2023-10-23 NOTE — TELEPHONE ENCOUNTER
Medication Refill Request    [facility] is requesting a refill of the following medication(s):   Requested Prescriptions     Pending Prescriptions Disp Refills    losartan (COZAAR) 25 MG tablet 90 tablet 0     Sig: Take 1 tablet by mouth daily        Listed PCP is Yanet Espitia MD   Last provider to prescribe medication: Dr. Padmini Winslow on 09/01/2023  Date of Last Office Visit at [facility]: 09/01/2023 with Dr. Lindsay Em: No future appointments. Please send refill to:    [facility] #[id number] - [address] [phone]  [address] [place] [address]  Phone: [phone] Fax: [phone]      Please review request and approve or deny with recommendations.

## 2024-01-15 ENCOUNTER — PATIENT MESSAGE (OUTPATIENT)
Age: 54
End: 2024-01-15

## 2024-01-15 DIAGNOSIS — Z01.84 IMMUNITY STATUS TESTING: Primary | ICD-10-CM

## 2024-01-15 NOTE — TELEPHONE ENCOUNTER
From: Nia Nur  To: Dr. Rose Vazquez  Sent: 1/15/2024 8:33 AM EST  Subject: Hep B Titer    Good Morning and Happy New Year!    I have a lab appointment open for a BMP to monitor my renal function after being placed on an ARB. I would like to ask if you could also order Hep B titer test? I have a new job and would to have the results for my employment records.    I would like to come in sometime this week for the blood draw if possible.    Thank you,  Nia Nur

## 2024-01-18 ENCOUNTER — NURSE ONLY (OUTPATIENT)
Age: 54
End: 2024-01-18

## 2024-01-18 DIAGNOSIS — I10 PRIMARY HYPERTENSION: ICD-10-CM

## 2024-01-18 DIAGNOSIS — E55.9 VITAMIN D DEFICIENCY, UNSPECIFIED: ICD-10-CM

## 2024-01-18 DIAGNOSIS — Z01.84 IMMUNITY STATUS TESTING: ICD-10-CM

## 2024-01-18 LAB
25(OH)D3 SERPL-MCNC: 16.2 NG/ML (ref 30–100)
ANION GAP SERPL CALC-SCNC: 5 MMOL/L (ref 5–15)
BUN SERPL-MCNC: 17 MG/DL (ref 6–20)
BUN/CREAT SERPL: 21 (ref 12–20)
CALCIUM SERPL-MCNC: 8.7 MG/DL (ref 8.5–10.1)
CHLORIDE SERPL-SCNC: 110 MMOL/L (ref 97–108)
CO2 SERPL-SCNC: 27 MMOL/L (ref 21–32)
CREAT SERPL-MCNC: 0.81 MG/DL (ref 0.55–1.02)
GLUCOSE SERPL-MCNC: 105 MG/DL (ref 65–100)
POTASSIUM SERPL-SCNC: 4 MMOL/L (ref 3.5–5.1)
SODIUM SERPL-SCNC: 142 MMOL/L (ref 136–145)

## 2024-01-19 LAB
HBV SURFACE AB SER QL: REACTIVE
HBV SURFACE AB SER-ACNC: 293.53 MIU/ML

## 2024-01-22 DIAGNOSIS — I10 HYPERTENSION, UNSPECIFIED TYPE: ICD-10-CM

## 2024-01-23 RX ORDER — VALSARTAN 80 MG/1
80 TABLET ORAL DAILY
Qty: 90 TABLET | Refills: 0 | Status: SHIPPED | OUTPATIENT
Start: 2024-01-23

## 2024-02-27 ENCOUNTER — PATIENT MESSAGE (OUTPATIENT)
Age: 54
End: 2024-02-27

## 2024-02-27 DIAGNOSIS — E55.9 VITAMIN D DEFICIENCY, UNSPECIFIED: Primary | ICD-10-CM

## 2024-02-28 NOTE — TELEPHONE ENCOUNTER
From: Nia Nur  To: Dr. Rose Vazquez  Sent: 2/27/2024 11:59 AM EST  Subject: Vitamin D Level    Hi Dr. Vazquez,    I saw your message about my Vitamin D levels being so low. I feel it. I increased how much I am taking (8000iu to 43759gn about twice weekly) but would appreciate a prescription. Am I correct in thinking it will be for 50,00iu q week?    Please send the prescription to [facility] [id number] at [address] in [place] [address]. The telephone number is [phone].     Thank you so much. I apologize for my late response.    Nia Nur, PharmD

## 2024-02-29 RX ORDER — ERGOCALCIFEROL 1.25 MG/1
50000 CAPSULE ORAL WEEKLY
Qty: 12 CAPSULE | Refills: 1 | Status: SHIPPED | OUTPATIENT
Start: 2024-02-29

## 2024-05-16 DIAGNOSIS — I10 HYPERTENSION, UNSPECIFIED TYPE: ICD-10-CM

## 2024-05-20 RX ORDER — VALSARTAN 80 MG/1
80 TABLET ORAL DAILY
Qty: 90 TABLET | Refills: 1 | Status: SHIPPED | OUTPATIENT
Start: 2024-05-20

## 2024-08-26 ENCOUNTER — TELEPHONE (OUTPATIENT)
Age: 54
End: 2024-08-26

## 2024-08-26 ENCOUNTER — TRANSCRIBE ORDERS (OUTPATIENT)
Facility: HOSPITAL | Age: 54
End: 2024-08-26

## 2024-08-26 DIAGNOSIS — Z12.31 VISIT FOR SCREENING MAMMOGRAM: Primary | ICD-10-CM

## 2024-08-26 NOTE — TELEPHONE ENCOUNTER
----- Message from Torres BURROWS sent at 8/26/2024  7:49 AM EDT -----  Regarding: [facility] Appointment Request  [facility] Appointment Request    Patient needs appointment for [facility] Appointment Type: Annual Visit.    Patient Requested Dates(s): September 10 or 11  Patient Requested Time: Any available  Provider Name: Rose Vazquez MD    Reason for Appointment Request: Established Patient - no available appointments need  --------------------------------------------------------------------------------------------------------------------------    Relationship to Patient: Self     Call Back Information: OK to leave message on voicemail  Preferred Call Back Number: Phone [phone] (home)

## 2024-09-05 ENCOUNTER — HOSPITAL ENCOUNTER (OUTPATIENT)
Facility: HOSPITAL | Age: 54
Discharge: HOME | End: 2024-09-08
Attending: FAMILY MEDICINE
Payer: COMMERCIAL

## 2024-09-05 VITALS — BODY MASS INDEX: 35.44 KG/M2 | HEIGHT: 63 IN | WEIGHT: 200 LBS

## 2024-09-05 DIAGNOSIS — Z12.31 VISIT FOR SCREENING MAMMOGRAM: ICD-10-CM

## 2024-09-05 PROCEDURE — 77067 SCR MAMMO BI INCL CAD: CPT

## 2024-09-11 DIAGNOSIS — I10 HYPERTENSION, UNSPECIFIED TYPE: ICD-10-CM

## 2024-09-12 RX ORDER — VALSARTAN 80 MG/1
80 TABLET ORAL DAILY
Qty: 90 TABLET | Refills: 0 | Status: SHIPPED | OUTPATIENT
Start: 2024-09-12

## 2024-12-06 DIAGNOSIS — I10 HYPERTENSION, UNSPECIFIED TYPE: ICD-10-CM

## 2024-12-06 RX ORDER — VALSARTAN 80 MG/1
80 TABLET ORAL DAILY
Qty: 90 TABLET | Refills: 1 | Status: SHIPPED | OUTPATIENT
Start: 2024-12-06

## 2024-12-06 NOTE — TELEPHONE ENCOUNTER
Medication Refill Request    Nia Nur is requesting a refill of the following medication(s):   Requested Prescriptions     Pending Prescriptions Disp Refills    valsartan (DIOVAN) 80 MG tablet [Pharmacy Med Name: VALSARTAN 80 MG TABLET] 90 tablet 0     Sig: take 1 tablet by mouth once daily        Listed PCP is Rose Vazquez MD   Last provider to prescribe medication: Rose Vazquez MD  Last Date of Medication Prescribed: 09.12.24   Date of Last Office Visit at [facility]: 09.01.23   FUTURE APPOINTMENT: No future appointments.    Please send refill to:    [id number] - [place] - [address] -  [phone] - F [phone]  [address] [facility] [address]  Phone: [phone] Fax: [phone]      Please review request and approve or deny with recommendations.

## 2025-06-19 ENCOUNTER — HOSPITAL ENCOUNTER (OUTPATIENT)
Facility: HOSPITAL | Age: 55
Setting detail: SPECIMEN
Discharge: HOME | End: 2025-06-22
Payer: COMMERCIAL

## 2025-06-19 ENCOUNTER — OFFICE VISIT (OUTPATIENT)
Age: 55
End: 2025-06-19
Payer: COMMERCIAL

## 2025-06-19 VITALS
OXYGEN SATURATION: 98 % | BODY MASS INDEX: 36.39 KG/M2 | TEMPERATURE: 98.4 F | HEART RATE: 65 BPM | DIASTOLIC BLOOD PRESSURE: 79 MMHG | SYSTOLIC BLOOD PRESSURE: 125 MMHG | WEIGHT: 205.4 LBS | HEIGHT: 63 IN | RESPIRATION RATE: 16 BRPM

## 2025-06-19 DIAGNOSIS — E66.812 CLASS 2 OBESITY WITHOUT SERIOUS COMORBIDITY WITH BODY MASS INDEX (BMI) OF 36.0 TO 36.9 IN ADULT, UNSPECIFIED OBESITY TYPE: ICD-10-CM

## 2025-06-19 DIAGNOSIS — Z01.419 WELL WOMAN EXAM WITH ROUTINE GYNECOLOGICAL EXAM: Primary | ICD-10-CM

## 2025-06-19 DIAGNOSIS — Z90.711 H/O ABDOMINAL SUPRACERVICAL SUBTOTAL HYSTERECTOMY: ICD-10-CM

## 2025-06-19 DIAGNOSIS — Z11.59 ENCOUNTER FOR HEPATITIS C SCREENING TEST FOR LOW RISK PATIENT: ICD-10-CM

## 2025-06-19 DIAGNOSIS — E55.9 VITAMIN D DEFICIENCY, UNSPECIFIED: ICD-10-CM

## 2025-06-19 DIAGNOSIS — I10 PRIMARY HYPERTENSION: ICD-10-CM

## 2025-06-19 LAB
CREAT UR-MCNC: 217 MG/DL
MICROALBUMIN UR-MCNC: 1.88 MG/DL
MICROALBUMIN/CREAT UR-RTO: 9 MG/G (ref 0–30)

## 2025-06-19 PROCEDURE — 3078F DIAST BP <80 MM HG: CPT | Performed by: FAMILY MEDICINE

## 2025-06-19 PROCEDURE — 99396 PREV VISIT EST AGE 40-64: CPT | Performed by: FAMILY MEDICINE

## 2025-06-19 PROCEDURE — 99213 OFFICE O/P EST LOW 20 MIN: CPT | Performed by: FAMILY MEDICINE

## 2025-06-19 PROCEDURE — 87624 HPV HI-RISK TYP POOLED RSLT: CPT

## 2025-06-19 PROCEDURE — 88175 CYTOPATH C/V AUTO FLUID REDO: CPT

## 2025-06-19 PROCEDURE — 3074F SYST BP LT 130 MM HG: CPT | Performed by: FAMILY MEDICINE

## 2025-06-19 RX ORDER — VALSARTAN 160 MG/1
160 TABLET ORAL DAILY
Qty: 90 TABLET | Refills: 1 | Status: SHIPPED | OUTPATIENT
Start: 2025-06-19

## 2025-06-19 SDOH — ECONOMIC STABILITY: FOOD INSECURITY: WITHIN THE PAST 12 MONTHS, THE FOOD YOU BOUGHT JUST DIDN'T LAST AND YOU DIDN'T HAVE MONEY TO GET MORE.: NEVER TRUE

## 2025-06-19 SDOH — ECONOMIC STABILITY: FOOD INSECURITY: WITHIN THE PAST 12 MONTHS, YOU WORRIED THAT YOUR FOOD WOULD RUN OUT BEFORE YOU GOT MONEY TO BUY MORE.: NEVER TRUE

## 2025-06-19 ASSESSMENT — PATIENT HEALTH QUESTIONNAIRE - PHQ9
SUM OF ALL RESPONSES TO PHQ QUESTIONS 1-9: 1
1. LITTLE INTEREST OR PLEASURE IN DOING THINGS: NOT AT ALL
SUM OF ALL RESPONSES TO PHQ QUESTIONS 1-9: 1
SUM OF ALL RESPONSES TO PHQ QUESTIONS 1-9: 1
2. FEELING DOWN, DEPRESSED OR HOPELESS: SEVERAL DAYS
SUM OF ALL RESPONSES TO PHQ QUESTIONS 1-9: 1

## 2025-06-19 ASSESSMENT — ENCOUNTER SYMPTOMS
SHORTNESS OF BREATH: 0
ABDOMINAL PAIN: 0

## 2025-06-19 NOTE — PROGRESS NOTES
Nia Nur is a 54 y.o. female      Chief Complaint   Patient presents with    Annual Exam     - discuss blood pressure        \"Have you been to the ER, urgent care clinic since your last visit?  Hospitalized since your last visit?\"    NO    “Have you seen or consulted any other health care providers outside of [facility] since your last visit?”    NO              Vitals:    06/19/25 0956   BP: 125/79   BP Site: Left Upper Arm   Patient Position: Sitting   BP Cuff Size: Large Adult   Pulse: 65   Resp: 16   Temp: 98.4 °F (36.9 °C)   TempSrc: Temporal   SpO2: 98%   Weight: 93.2 kg (205 lb 6.4 oz)   Height: 1.6 m (5' 3\")            Health Maintenance Due   Topic Date Due    HIV screen  Never done    Hepatitis C screen  Never done    Hepatitis B vaccine (1 of 3 - 19+ 3-dose series) Never done    Diabetes screen  Never done    Pneumococcal 50+ years Vaccine (1 of 1 - PCV) Never done    DTaP/Tdap/Td vaccine (2 - Td or Tdap) 08/14/2024    COVID-19 Vaccine (5 - 2024-25 season) 09/01/2024         Medication Reconciliation completed, changes noted.  Please  Update medication list.

## 2025-06-19 NOTE — PROGRESS NOTES
History of Present Illness:     Chief Complaint   Patient presents with    Annual Exam     - discuss blood pressure        Nia Nur is a 54 y.o. female     History of Present Illness  54-year-old female presents for physical and follow-up.    Hypertension  - Managed with valsartan 80 mg daily  - Recent BP spike during stress (daughter's graduation, workplace closure)  - Doubled medication dosage  - Missed dose yesterday due to travel  - No neck lumps, chest pain, respiratory issues, abdominal pain, or urinary problems  - Feels well during extended walks    Vitamin D Deficiency  - Level 16.2 in 01/2024    Hysterectomy  - For fibroids and bleeding  - Cervix intact  - One ovary retained  - Procedure by [facility]    Preventive Screenings and Vaccinations  - Up to date on colon cancer screening (normal in 09/2021)  - Up to date on breast cancer screening (normal mammogram in 09/2024)  - Completed shingles vaccine series  - Completed hepatitis B series, confirmed immunity in 2024  - Considering PCV20 vaccine  - Agreeable to STD screenings  - Unsure about last tetanus vaccine, likely within past 10 years    Lifestyle and Hydration  - Engaging in more physical activity  - Cooking more meals at home  - Often relies on leftovers due to work schedule  - Hydration primarily from water, occasional coffee with creamer and small soda  - Acknowledges inadequate hydration    Recent Illness  - Recovering from illness with drainage, ear pain, cough, and runny nose  - No Zyrtec for several days    Supplemental information: PAST SURGICAL HISTORY: Hysterectomy for fibroids and bleeding for benign reasons.    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

## 2025-06-21 LAB
25(OH)D3 SERPL-MCNC: 34.5 NG/ML (ref 30–100)
ALBUMIN SERPL-MCNC: 3.9 G/DL (ref 3.5–5)
ALBUMIN/GLOB SERPL: 1.2 (ref 1.1–2.2)
ALP SERPL-CCNC: 77 U/L (ref 45–117)
ALT SERPL-CCNC: 24 U/L (ref 12–78)
ANION GAP SERPL CALC-SCNC: 5 MMOL/L (ref 2–12)
AST SERPL-CCNC: 19 U/L (ref 15–37)
BILIRUB SERPL-MCNC: 0.5 MG/DL (ref 0.2–1)
BUN SERPL-MCNC: 14 MG/DL (ref 6–20)
BUN/CREAT SERPL: 18 (ref 12–20)
CALCIUM SERPL-MCNC: 8.9 MG/DL (ref 8.5–10.1)
CHLORIDE SERPL-SCNC: 105 MMOL/L (ref 97–108)
CHOLEST SERPL-MCNC: 140 MG/DL
CO2 SERPL-SCNC: 30 MMOL/L (ref 21–32)
CREAT SERPL-MCNC: 0.77 MG/DL (ref 0.55–1.02)
ERYTHROCYTE [DISTWIDTH] IN BLOOD BY AUTOMATED COUNT: 13.5 % (ref 11.5–14.5)
EST. AVERAGE GLUCOSE BLD GHB EST-MCNC: 103 MG/DL
GLOBULIN SER CALC-MCNC: 3.3 G/DL (ref 2–4)
GLUCOSE SERPL-MCNC: 87 MG/DL (ref 65–100)
HBA1C MFR BLD: 5.2 % (ref 4–5.6)
HCT VFR BLD AUTO: 36.7 % (ref 35–47)
HCV AB SER IA-ACNC: 0.09 INDEX
HCV AB SERPL QL IA: NONREACTIVE
HDLC SERPL-MCNC: 48 MG/DL
HDLC SERPL: 2.9 (ref 0–5)
HGB BLD-MCNC: 10.7 G/DL (ref 11.5–16)
HIV 1+2 AB+HIV1 P24 AG SERPL QL IA: NONREACTIVE
HIV 1/2 RESULT COMMENT: NORMAL
LDLC SERPL CALC-MCNC: 74.8 MG/DL (ref 0–100)
MCH RBC QN AUTO: 25.6 PG (ref 26–34)
MCHC RBC AUTO-ENTMCNC: 29.2 G/DL (ref 30–36.5)
MCV RBC AUTO: 87.8 FL (ref 80–99)
NRBC # BLD: 0 K/UL (ref 0–0.01)
NRBC BLD-RTO: 0 PER 100 WBC
PLATELET # BLD AUTO: 349 K/UL (ref 150–400)
PMV BLD AUTO: 9.7 FL (ref 8.9–12.9)
POTASSIUM SERPL-SCNC: 3.9 MMOL/L (ref 3.5–5.1)
PROT SERPL-MCNC: 7.2 G/DL (ref 6.4–8.2)
RBC # BLD AUTO: 4.18 M/UL (ref 3.8–5.2)
SODIUM SERPL-SCNC: 140 MMOL/L (ref 136–145)
SPECIMEN HOLD: NORMAL
TRIGL SERPL-MCNC: 86 MG/DL
VLDLC SERPL CALC-MCNC: 17.2 MG/DL
WBC # BLD AUTO: 6.3 K/UL (ref 3.6–11)

## 2025-06-23 ENCOUNTER — RESULTS FOLLOW-UP (OUTPATIENT)
Age: 55
End: 2025-06-23

## 2025-06-25 LAB — HPV I/H RISK 1 DNA CVX QL PROBE+SIG AMP: NEGATIVE

## 2025-06-26 ENCOUNTER — RESULTS FOLLOW-UP (OUTPATIENT)
Age: 55
End: 2025-06-26